# Patient Record
Sex: FEMALE | Race: WHITE | NOT HISPANIC OR LATINO | Employment: UNEMPLOYED | URBAN - METROPOLITAN AREA
[De-identification: names, ages, dates, MRNs, and addresses within clinical notes are randomized per-mention and may not be internally consistent; named-entity substitution may affect disease eponyms.]

---
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
## 2020-06-21 ENCOUNTER — HOSPITAL ENCOUNTER (EMERGENCY)
Facility: HOSPITAL | Age: 20
Discharge: HOME | End: 2020-06-21
Attending: EMERGENCY MEDICINE
Payer: COMMERCIAL

## 2020-06-21 VITALS
HEART RATE: 76 BPM | BODY MASS INDEX: 39.05 KG/M2 | OXYGEN SATURATION: 97 % | TEMPERATURE: 98.1 F | DIASTOLIC BLOOD PRESSURE: 79 MMHG | HEIGHT: 66 IN | SYSTOLIC BLOOD PRESSURE: 128 MMHG | RESPIRATION RATE: 14 BRPM | WEIGHT: 243 LBS

## 2020-06-21 DIAGNOSIS — R00.2 PALPITATIONS: Primary | ICD-10-CM

## 2020-06-21 LAB
ALBUMIN SERPL BCP-MCNC: 4.4 G/DL (ref 3.5–5)
ALP SERPL-CCNC: 93 U/L (ref 46–116)
ALT SERPL W P-5'-P-CCNC: 20 U/L (ref 12–78)
ANION GAP SERPL CALCULATED.3IONS-SCNC: 6 MMOL/L (ref 4–13)
AST SERPL W P-5'-P-CCNC: 11 U/L (ref 5–45)
BASOPHILS # BLD AUTO: 0.07 THOUSANDS/ΜL (ref 0–0.1)
BASOPHILS NFR BLD AUTO: 1 % (ref 0–1)
BILIRUB SERPL-MCNC: 0.41 MG/DL (ref 0.2–1)
BUN SERPL-MCNC: 12 MG/DL (ref 5–25)
CALCIUM SERPL-MCNC: 9.4 MG/DL (ref 8.3–10.1)
CHLORIDE SERPL-SCNC: 102 MMOL/L (ref 100–108)
CO2 SERPL-SCNC: 30 MMOL/L (ref 21–32)
CREAT SERPL-MCNC: 0.71 MG/DL (ref 0.6–1.3)
EOSINOPHIL # BLD AUTO: 0.17 THOUSAND/ΜL (ref 0–0.61)
EOSINOPHIL NFR BLD AUTO: 2 % (ref 0–6)
ERYTHROCYTE [DISTWIDTH] IN BLOOD BY AUTOMATED COUNT: 12 % (ref 11.6–15.1)
GFR SERPL CREATININE-BSD FRML MDRD: 122 ML/MIN/1.73SQ M
GLUCOSE SERPL-MCNC: 93 MG/DL (ref 65–140)
HCT VFR BLD AUTO: 43.5 % (ref 34.8–46.1)
HGB BLD-MCNC: 14.6 G/DL (ref 11.5–15.4)
IMM GRANULOCYTES # BLD AUTO: 0.01 THOUSAND/UL (ref 0–0.2)
IMM GRANULOCYTES NFR BLD AUTO: 0 % (ref 0–2)
LYMPHOCYTES # BLD AUTO: 2.49 THOUSANDS/ΜL (ref 0.6–4.47)
LYMPHOCYTES NFR BLD AUTO: 33 % (ref 14–44)
MAGNESIUM SERPL-MCNC: 1.7 MG/DL (ref 1.6–2.6)
MCH RBC QN AUTO: 30.5 PG (ref 26.8–34.3)
MCHC RBC AUTO-ENTMCNC: 33.6 G/DL (ref 31.4–37.4)
MCV RBC AUTO: 91 FL (ref 82–98)
MONOCYTES # BLD AUTO: 0.64 THOUSAND/ΜL (ref 0.17–1.22)
MONOCYTES NFR BLD AUTO: 8 % (ref 4–12)
NEUTROPHILS # BLD AUTO: 4.22 THOUSANDS/ΜL (ref 1.85–7.62)
NEUTS SEG NFR BLD AUTO: 56 % (ref 43–75)
NRBC BLD AUTO-RTO: 0 /100 WBCS
PLATELET # BLD AUTO: 356 THOUSANDS/UL (ref 149–390)
PMV BLD AUTO: 8.8 FL (ref 8.9–12.7)
POTASSIUM SERPL-SCNC: 4.4 MMOL/L (ref 3.5–5.3)
PROT SERPL-MCNC: 8.2 G/DL (ref 6.4–8.2)
RBC # BLD AUTO: 4.78 MILLION/UL (ref 3.81–5.12)
SODIUM SERPL-SCNC: 138 MMOL/L (ref 136–145)
TSH SERPL DL<=0.05 MIU/L-ACNC: 1.66 UIU/ML (ref 0.36–3.74)
WBC # BLD AUTO: 7.6 THOUSAND/UL (ref 4.31–10.16)

## 2020-06-21 PROCEDURE — 99283 EMERGENCY DEPT VISIT LOW MDM: CPT | Performed by: EMERGENCY MEDICINE

## 2020-06-21 PROCEDURE — 36415 COLL VENOUS BLD VENIPUNCTURE: CPT | Performed by: EMERGENCY MEDICINE

## 2020-06-21 PROCEDURE — 84443 ASSAY THYROID STIM HORMONE: CPT | Performed by: EMERGENCY MEDICINE

## 2020-06-21 PROCEDURE — 85025 COMPLETE CBC W/AUTO DIFF WBC: CPT | Performed by: EMERGENCY MEDICINE

## 2020-06-21 PROCEDURE — 99285 EMERGENCY DEPT VISIT HI MDM: CPT

## 2020-06-21 PROCEDURE — 83735 ASSAY OF MAGNESIUM: CPT | Performed by: EMERGENCY MEDICINE

## 2020-06-21 PROCEDURE — 93005 ELECTROCARDIOGRAM TRACING: CPT

## 2020-06-21 PROCEDURE — 80053 COMPREHEN METABOLIC PANEL: CPT | Performed by: EMERGENCY MEDICINE

## 2020-06-21 RX ORDER — CLOTRIMAZOLE 1 %
CREAM (GRAM) TOPICAL 2 TIMES DAILY
COMMUNITY
Start: 2016-11-03 | End: 2020-06-21

## 2020-06-21 RX ORDER — DIAPER,BRIEF,INFANT-TODD,DISP
EACH MISCELLANEOUS 2 TIMES DAILY
COMMUNITY
Start: 2016-11-03 | End: 2020-06-21

## 2020-06-21 RX ORDER — DEXTROAMPHETAMINE SACCHARATE, AMPHETAMINE ASPARTATE MONOHYDRATE, DEXTROAMPHETAMINE SULFATE AND AMPHETAMINE SULFATE 7.5; 7.5; 7.5; 7.5 MG/1; MG/1; MG/1; MG/1
1 CAPSULE, EXTENDED RELEASE ORAL EVERY 24 HOURS
COMMUNITY
Start: 2010-03-22

## 2020-06-21 RX ORDER — ALBUTEROL SULFATE 90 UG/1
2 AEROSOL, METERED RESPIRATORY (INHALATION) EVERY 4 HOURS
COMMUNITY
Start: 2010-01-21

## 2020-06-22 LAB
ATRIAL RATE: 79 BPM
P AXIS: 34 DEGREES
PR INTERVAL: 154 MS
QRS AXIS: 65 DEGREES
QRSD INTERVAL: 82 MS
QT INTERVAL: 356 MS
QTC INTERVAL: 408 MS
T WAVE AXIS: 29 DEGREES
VENTRICULAR RATE: 79 BPM

## 2020-06-22 PROCEDURE — 93010 ELECTROCARDIOGRAM REPORT: CPT | Performed by: INTERNAL MEDICINE

## 2020-07-25 ENCOUNTER — APPOINTMENT (EMERGENCY)
Dept: RADIOLOGY | Facility: HOSPITAL | Age: 20
End: 2020-07-25
Payer: COMMERCIAL

## 2020-07-25 ENCOUNTER — HOSPITAL ENCOUNTER (EMERGENCY)
Facility: HOSPITAL | Age: 20
Discharge: HOME | End: 2020-07-25
Attending: EMERGENCY MEDICINE | Admitting: EMERGENCY MEDICINE
Payer: COMMERCIAL

## 2020-07-25 VITALS
WEIGHT: 250.88 LBS | TEMPERATURE: 98.6 F | BODY MASS INDEX: 41.11 KG/M2 | RESPIRATION RATE: 18 BRPM | DIASTOLIC BLOOD PRESSURE: 71 MMHG | OXYGEN SATURATION: 97 % | HEART RATE: 84 BPM | SYSTOLIC BLOOD PRESSURE: 123 MMHG

## 2020-07-25 DIAGNOSIS — R07.9 CHEST PAIN: Primary | ICD-10-CM

## 2020-07-25 DIAGNOSIS — R82.71 BACTERIURIA: ICD-10-CM

## 2020-07-25 DIAGNOSIS — R10.9 ABDOMINAL PAIN: ICD-10-CM

## 2020-07-25 LAB
ALBUMIN SERPL BCP-MCNC: 4.2 G/DL (ref 3.5–5)
ALP SERPL-CCNC: 93 U/L (ref 46–116)
ALT SERPL W P-5'-P-CCNC: 27 U/L (ref 12–78)
ANION GAP SERPL CALCULATED.3IONS-SCNC: 9 MMOL/L (ref 4–13)
AST SERPL W P-5'-P-CCNC: 15 U/L (ref 5–45)
BACTERIA UR QL AUTO: ABNORMAL /HPF
BASOPHILS # BLD AUTO: 0.05 THOUSANDS/ΜL (ref 0–0.1)
BASOPHILS NFR BLD AUTO: 1 % (ref 0–1)
BILIRUB SERPL-MCNC: 0.42 MG/DL (ref 0.2–1)
BILIRUB UR QL STRIP: NEGATIVE
BUN SERPL-MCNC: 6 MG/DL (ref 5–25)
CALCIUM SERPL-MCNC: 8.7 MG/DL (ref 8.3–10.1)
CHLORIDE SERPL-SCNC: 102 MMOL/L (ref 100–108)
CLARITY UR: CLEAR
CO2 SERPL-SCNC: 26 MMOL/L (ref 21–32)
COLOR UR: YELLOW
CREAT SERPL-MCNC: 0.7 MG/DL (ref 0.6–1.3)
EOSINOPHIL # BLD AUTO: 0.12 THOUSAND/ΜL (ref 0–0.61)
EOSINOPHIL NFR BLD AUTO: 2 % (ref 0–6)
ERYTHROCYTE [DISTWIDTH] IN BLOOD BY AUTOMATED COUNT: 12 % (ref 11.6–15.1)
EXT PREG TEST URINE: NEGATIVE
EXT. CONTROL ED NAV: NORMAL
GFR SERPL CREATININE-BSD FRML MDRD: 124 ML/MIN/1.73SQ M
GLUCOSE SERPL-MCNC: 86 MG/DL (ref 65–140)
GLUCOSE UR STRIP-MCNC: NEGATIVE MG/DL
HCT VFR BLD AUTO: 41.5 % (ref 34.8–46.1)
HGB BLD-MCNC: 14.1 G/DL (ref 11.5–15.4)
HGB UR QL STRIP.AUTO: ABNORMAL
IMM GRANULOCYTES # BLD AUTO: 0.02 THOUSAND/UL (ref 0–0.2)
IMM GRANULOCYTES NFR BLD AUTO: 0 % (ref 0–2)
KETONES UR STRIP-MCNC: NEGATIVE MG/DL
LEUKOCYTE ESTERASE UR QL STRIP: ABNORMAL
LYMPHOCYTES # BLD AUTO: 2.23 THOUSANDS/ΜL (ref 0.6–4.47)
LYMPHOCYTES NFR BLD AUTO: 32 % (ref 14–44)
MCH RBC QN AUTO: 30.9 PG (ref 26.8–34.3)
MCHC RBC AUTO-ENTMCNC: 34 G/DL (ref 31.4–37.4)
MCV RBC AUTO: 91 FL (ref 82–98)
MONOCYTES # BLD AUTO: 0.57 THOUSAND/ΜL (ref 0.17–1.22)
MONOCYTES NFR BLD AUTO: 8 % (ref 4–12)
NEUTROPHILS # BLD AUTO: 4.02 THOUSANDS/ΜL (ref 1.85–7.62)
NEUTS SEG NFR BLD AUTO: 57 % (ref 43–75)
NITRITE UR QL STRIP: NEGATIVE
NON-SQ EPI CELLS URNS QL MICRO: ABNORMAL /HPF
NRBC BLD AUTO-RTO: 0 /100 WBCS
PH UR STRIP.AUTO: 7 [PH] (ref 4.5–8)
PLATELET # BLD AUTO: 335 THOUSANDS/UL (ref 149–390)
PMV BLD AUTO: 8.7 FL (ref 8.9–12.7)
POTASSIUM SERPL-SCNC: 3.8 MMOL/L (ref 3.5–5.3)
PROT SERPL-MCNC: 7.8 G/DL (ref 6.4–8.2)
PROT UR STRIP-MCNC: NEGATIVE MG/DL
RBC # BLD AUTO: 4.56 MILLION/UL (ref 3.81–5.12)
RBC #/AREA URNS AUTO: ABNORMAL /HPF
SODIUM SERPL-SCNC: 137 MMOL/L (ref 136–145)
SP GR UR STRIP.AUTO: 1.01 (ref 1–1.03)
TROPONIN I SERPL-MCNC: <0.02 NG/ML
TSH SERPL DL<=0.05 MIU/L-ACNC: 1.19 UIU/ML (ref 0.36–3.74)
UROBILINOGEN UR QL STRIP.AUTO: 0.2 E.U./DL
WBC # BLD AUTO: 7.01 THOUSAND/UL (ref 4.31–10.16)
WBC #/AREA URNS AUTO: ABNORMAL /HPF

## 2020-07-25 PROCEDURE — 71045 X-RAY EXAM CHEST 1 VIEW: CPT

## 2020-07-25 PROCEDURE — 84484 ASSAY OF TROPONIN QUANT: CPT | Performed by: PHYSICIAN ASSISTANT

## 2020-07-25 PROCEDURE — 36415 COLL VENOUS BLD VENIPUNCTURE: CPT | Performed by: PHYSICIAN ASSISTANT

## 2020-07-25 PROCEDURE — 81001 URINALYSIS AUTO W/SCOPE: CPT

## 2020-07-25 PROCEDURE — 80053 COMPREHEN METABOLIC PANEL: CPT | Performed by: PHYSICIAN ASSISTANT

## 2020-07-25 PROCEDURE — 93005 ELECTROCARDIOGRAM TRACING: CPT

## 2020-07-25 PROCEDURE — 99285 EMERGENCY DEPT VISIT HI MDM: CPT

## 2020-07-25 PROCEDURE — 96374 THER/PROPH/DIAG INJ IV PUSH: CPT

## 2020-07-25 PROCEDURE — 99284 EMERGENCY DEPT VISIT MOD MDM: CPT | Performed by: PHYSICIAN ASSISTANT

## 2020-07-25 PROCEDURE — 81025 URINE PREGNANCY TEST: CPT | Performed by: PHYSICIAN ASSISTANT

## 2020-07-25 PROCEDURE — 87086 URINE CULTURE/COLONY COUNT: CPT

## 2020-07-25 PROCEDURE — 85025 COMPLETE CBC W/AUTO DIFF WBC: CPT | Performed by: PHYSICIAN ASSISTANT

## 2020-07-25 PROCEDURE — 84443 ASSAY THYROID STIM HORMONE: CPT | Performed by: PHYSICIAN ASSISTANT

## 2020-07-25 RX ORDER — LIDOCAINE HYDROCHLORIDE 20 MG/ML
15 SOLUTION OROPHARYNGEAL ONCE
Status: COMPLETED | OUTPATIENT
Start: 2020-07-25 | End: 2020-07-25

## 2020-07-25 RX ORDER — MAGNESIUM HYDROXIDE/ALUMINUM HYDROXICE/SIMETHICONE 120; 1200; 1200 MG/30ML; MG/30ML; MG/30ML
30 SUSPENSION ORAL ONCE
Status: COMPLETED | OUTPATIENT
Start: 2020-07-25 | End: 2020-07-25

## 2020-07-25 RX ORDER — NITROFURANTOIN 25; 75 MG/1; MG/1
100 CAPSULE ORAL EVERY 12 HOURS SCHEDULED
Qty: 10 CAPSULE | Refills: 0 | Status: SHIPPED | OUTPATIENT
Start: 2020-07-25 | End: 2020-07-30

## 2020-07-25 RX ORDER — KETOROLAC TROMETHAMINE 30 MG/ML
15 INJECTION, SOLUTION INTRAMUSCULAR; INTRAVENOUS ONCE
Status: COMPLETED | OUTPATIENT
Start: 2020-07-25 | End: 2020-07-25

## 2020-07-25 RX ADMIN — ALUMINUM HYDROXIDE, MAGNESIUM HYDROXIDE, AND SIMETHICONE 30 ML: 200; 200; 20 SUSPENSION ORAL at 16:23

## 2020-07-25 RX ADMIN — KETOROLAC TROMETHAMINE 15 MG: 30 INJECTION, SOLUTION INTRAMUSCULAR at 16:27

## 2020-07-25 RX ADMIN — LIDOCAINE HYDROCHLORIDE 15 ML: 20 SOLUTION ORAL; TOPICAL at 16:24

## 2020-07-25 NOTE — DISCHARGE INSTRUCTIONS
Chest Pain   WHAT YOU NEED TO KNOW:   Chest pain can be caused by a range of conditions, from not serious to life-threatening  Chest pain can be a symptom of a digestive problem, such as acid reflux or a stomach ulcer  An anxiety attack or a strong emotion, such as anger, can also cause chest pain  Infection, inflammation, or a fracture in the bones or cartilage in your chest can cause pain or discomfort  Sometimes chest pain or pressure is caused by poor blood flow to your heart (angina)  Chest pain may also be caused by life-threatening conditions such as a heart attack or blood clot in your lungs  DISCHARGE INSTRUCTIONS:   Call 911 if:   · You have any of the following signs of a heart attack:      ¨ Squeezing, pressure, or pain in your chest that lasts longer than 5 minutes or returns    ¨ Discomfort or pain in your back, neck, jaw, stomach, or arm     ¨ Trouble breathing    ¨ Nausea or vomiting    ¨ Lightheadedness or a sudden cold sweat, especially with chest pain or trouble breathing    Return to the emergency department if:   · You have chest discomfort that gets worse, even with medicine  · You cough or vomit blood  · Your bowel movements are black or bloody  · You cannot stop vomiting, or it hurts to swallow  Contact your healthcare provider if:   · You have questions or concerns about your condition or care  Medicines:   · Medicines  may be given to treat the cause of your chest pain  Examples include pain medicine, anxiety medicine, or medicines to increase blood flow to your heart  · Do not take certain medicines without asking your healthcare provider first   These include NSAIDs, herbal or vitamin supplements, or hormones (estrogen or progestin)  · Take your medicine as directed  Contact your healthcare provider if you think your medicine is not helping or if you have side effects  Tell him or her if you are allergic to any medicine   Keep a list of the medicines, vitamins, and herbs you take  Include the amounts, and when and why you take them  Bring the list or the pill bottles to follow-up visits  Carry your medicine list with you in case of an emergency  Follow up with your healthcare provider within 72 hours, or as directed: You may need to return for more tests to find the cause of your chest pain  You may be referred to a specialist, such as a cardiologist or gastroenterologist  Write down your questions so you remember to ask them during your visits  Healthy living tips: The following are general healthy guidelines  If your chest pain is caused by a heart problem, your healthcare provider will give you specific guidelines to follow  · Do not smoke  Nicotine and other chemicals in cigarettes and cigars can cause lung and heart damage  Ask your healthcare provider for information if you currently smoke and need help to quit  E-cigarettes or smokeless tobacco still contain nicotine  Talk to your healthcare provider before you use these products  · Eat a variety of healthy, low-fat foods  Healthy foods include fruits, vegetables, whole-grain breads, low-fat dairy products, beans, lean meats, and fish  Ask for more information about a heart healthy diet  · Ask about activity  Your healthcare provider will tell you which activities to limit or avoid  Ask when you can drive, return to work, and have sex  Ask about the best exercise plan for you  · Maintain a healthy weight  Ask your healthcare provider how much you should weigh  Ask him or her to help you create a weight loss plan if you are overweight  · Get the flu and pneumonia vaccines  All adults should get the influenza (flu) vaccine  Get it every year as soon as it becomes available  The pneumococcal vaccine is given to adults aged 72 years or older  The vaccine is given every 5 years to prevent pneumococcal disease, such as pneumonia    © 2017 [employer] Information is for End User's use only and may not be sold, redistributed or otherwise used for commercial purposes  All illustrations and images included in CareNotes® are the copyrighted property of [employer]  or [employer]  The above information is an  only  It is not intended as medical advice for individual conditions or treatments  Talk to your doctor, nurse or pharmacist before following any medical regimen to see if it is safe and effective for you

## 2020-07-25 NOTE — ED PROVIDER NOTES
History  Chief Complaint   Patient presents with    Chest Pain     chest pressure with discomfort in abdomen,began today  no n/v/d no jaw pain      Davon Malone is a 23 y o  Female with a PMHX of ADHD, Anxiety and Asthma who presents to the ED with complaints of intermittent chest tightness and palpitations over the past month  Patient was seen for similar complaint on 06/21/2020 during which time work-up was unremarkable  Patient states she started having chest pressure and abdominal pain/bloating while she was at work  Patient describes the aching intermittent pain as "the entire chest but mainly my heart " Patient feels as though her "heart is beating out of my chest " Patient states she is supposed to be at her sister's baby shower for an event today but wanted to "make sure nothing was wrong and I am not pregnant " Patient states she was previously on birth control but had to discontinue medication due to significant weight gain  Patient states she is sexually active with 1 male partner  Denies nausea, vomiting, diarrhea, constipation, decreased appetite, headache, neck pain, neck stiffness, changes in vision, syncope, dizziness, leg pain, leg swelling, cough, congestion, SOB, wheezing, hemoptysis, fever, chills  Patient denies family hx of CAD  Patient denies family hx of sudden death         History provided by:  Patient  Chest Pain   Pain location:  L lateral chest ("Entire Chest")  Pain quality: aching and pressure    Pain severity:  Moderate  Timing:  Intermittent  Progression:  Waxing and waning  Associated symptoms: abdominal pain and palpitations    Associated symptoms: no AICD problem, no altered mental status, no anorexia, no back pain, no claudication, no cough, no diaphoresis, no dizziness, no dysphagia, no fatigue, no fever, no headache, no heartburn, no lower extremity edema, no nausea, no near-syncope, no numbness, no orthopnea, no PND, no shortness of breath, no syncope, not vomiting and no weakness    Risk factors: obesity    Risk factors: no coronary artery disease, no high cholesterol, no hypertension, not pregnant, no prior DVT/PE and no smoking        Prior to Admission Medications   Prescriptions Last Dose Informant Patient Reported? Taking? Cholecalciferol 50 MCG (2000 UT) CAPS 7/25/2020 at Unknown time  Yes Yes   Sig: Take by mouth   albuterol (ProAir HFA) 90 mcg/act inhaler 7/24/2020 at Unknown time  Yes Yes   Sig: Inhale 2 puffs every 4 (four) hours   amphetamine-dextroamphetamine (ADDERALL XR, 30MG,) 30 MG 24 hr capsule 7/25/2020 at Unknown time  Yes Yes   Sig: Take 1 capsule by mouth every 24 hours   fluticasone-salmeterol (Advair Diskus) 100-50 mcg/dose inhaler 7/25/2020 at Unknown time  Yes Yes   Sig: Inhale      Facility-Administered Medications: None       Past Medical History:   Diagnosis Date    ADHD     Anxiety     Asthma     Depression     Psychiatric disorder        Past Surgical History:   Procedure Laterality Date    CHOLECYSTECTOMY      TONSILLECTOMY         No family history on file  I have reviewed and agree with the history as documented  E-Cigarette/Vaping    E-Cigarette Use Never User      E-Cigarette/Vaping Substances     Social History     Tobacco Use    Smoking status: Never Smoker    Smokeless tobacco: Never Used   Substance Use Topics    Alcohol use: Never     Frequency: Never    Drug use: Never       Review of Systems   Constitutional: Negative for appetite change, chills, diaphoresis, fatigue, fever and unexpected weight change  HENT: Negative for congestion, drooling, ear pain, rhinorrhea, sore throat, trouble swallowing and voice change  Eyes: Negative for pain, discharge, redness and visual disturbance  Respiratory: Negative for cough, shortness of breath, wheezing and stridor  Cardiovascular: Positive for chest pain and palpitations  Negative for orthopnea, claudication, leg swelling, syncope, PND and near-syncope     Gastrointestinal: Positive for abdominal distention and abdominal pain  Negative for anorexia, blood in stool, constipation, diarrhea, heartburn, nausea and vomiting  Genitourinary: Negative for dysuria, flank pain, frequency, hematuria and urgency  Musculoskeletal: Negative for back pain, gait problem, joint swelling, neck pain and neck stiffness  Skin: Negative for color change and rash  Neurological: Negative for dizziness, seizures, weakness, light-headedness, numbness and headaches  Physical Exam  Physical Exam   Constitutional: She is oriented to person, place, and time  She appears well-developed and well-nourished  HENT:   Head: Normocephalic and atraumatic  Nose: Nose normal    Mouth/Throat: Oropharynx is clear and moist    Eyes: Pupils are equal, round, and reactive to light  Conjunctivae and EOM are normal    Cardiovascular: Normal rate, regular rhythm and intact distal pulses  Pulmonary/Chest: Effort normal and breath sounds normal  She exhibits tenderness  Abdominal: Normal appearance and bowel sounds are normal  There is no tenderness  There is no rigidity, no guarding and no CVA tenderness  Musculoskeletal: Normal range of motion  No lower extremity edema or erythema  Negative dylon's sign bilaterally  Neurological: She is alert and oriented to person, place, and time  Skin: Skin is warm and dry  Capillary refill takes less than 2 seconds  Psychiatric: She has a normal mood and affect  Nursing note and vitals reviewed        Vital Signs  ED Triage Vitals   Temperature Pulse Respirations Blood Pressure SpO2   07/25/20 1536 07/25/20 1534 07/25/20 1534 07/25/20 1534 07/25/20 1534   98 6 °F (37 °C) 85 18 123/71 99 %      Temp Source Heart Rate Source Patient Position - Orthostatic VS BP Location FiO2 (%)   07/25/20 1536 07/25/20 1534 07/25/20 1534 07/25/20 1534 --   Oral Monitor Sitting Right arm       Pain Score       07/25/20 1534       4           Vitals:    07/25/20 1534   BP: 123/71 Pulse: 85   Patient Position - Orthostatic VS: Sitting         Visual Acuity      ED Medications  Medications   ketorolac (TORADOL) injection 15 mg (15 mg Intravenous Given 7/25/20 1627)   aluminum-magnesium hydroxide-simethicone (MYLANTA) 200-200-20 mg/5 mL oral suspension 30 mL (30 mL Oral Given 7/25/20 1623)   Lidocaine Viscous HCl (XYLOCAINE) 2 % mucosal solution 15 mL (15 mL Swish & Spit Given 7/25/20 1624)       Diagnostic Studies  Results Reviewed     Procedure Component Value Units Date/Time    Urine Microscopic [[id number]]  (Abnormal) Collected:  07/25/20 1635    Lab Status:  Final result Specimen:  Urine, Clean Catch Updated:  07/25/20 1700     RBC, UA 4-10 /hpf      WBC, UA 30-50 /hpf      Epithelial Cells Moderate /hpf      Bacteria, UA Moderate /hpf     Urine culture [[id number]] Collected:  07/25/20 1635    Lab Status: In process Specimen:  Urine, Clean Catch Updated:  07/25/20 1700    TSH [[id number]]  (Normal) Collected:  07/25/20 1604    Lab Status:  Final result Specimen:  Blood from Arm, Right Updated:  07/25/20 1656     TSH 3RD GENERATON 1 192 uIU/mL     Narrative:       Patients undergoing fluorescein dye angiography may retain small amounts of fluorescein in the body for 48-72 hours post procedure  Samples containing fluorescein can produce falsely depressed TSH values  If the patient had this procedure,a specimen should be resubmitted post fluorescein clearance        Troponin I [[id number]]  (Normal) Collected:  07/25/20 1604    Lab Status:  Final result Specimen:  Blood from Arm, Right Updated:  07/25/20 1650     Troponin I <0 02 ng/mL     Comprehensive metabolic panel [[id number]] Collected:  07/25/20 1604    Lab Status:  Final result Specimen:  Blood from Arm, Right Updated:  07/25/20 1648     Sodium 137 mmol/L      Potassium 3 8 mmol/L      Chloride 102 mmol/L      CO2 26 mmol/L      ANION GAP 9 mmol/L      BUN 6 mg/dL      Creatinine 0 70 mg/dL      Glucose 86 mg/dL      Calcium 8 7 mg/dL AST 15 U/L      ALT 27 U/L      Alkaline Phosphatase 93 U/L      Total Protein 7 8 g/dL      Albumin 4 2 g/dL      Total Bilirubin 0 42 mg/dL      eGFR 124 ml/min/1 73sq m     Narrative:       [place] guidelines for Chronic Kidney Disease (CKD):     Stage 1 with normal or high GFR (GFR > 90 mL/min/1 73 square meters)    Stage 2 Mild CKD (GFR = 60-89 mL/min/1 73 square meters)    Stage 3A Moderate CKD (GFR = 45-59 mL/min/1 73 square meters)    Stage 3B Moderate CKD (GFR = 30-44 mL/min/1 73 square meters)    Stage 4 Severe CKD (GFR = 15-29 mL/min/1 73 square meters)    Stage 5 End Stage CKD (GFR <15 mL/min/1 73 square meters)  Note: GFR calculation is accurate only with a steady state creatinine    POCT pregnancy, urine [[id number]]  (Normal) Resulted:  07/25/20 1621    Lab Status:  Final result Updated:  07/25/20 1622     EXT PREG TEST UR (Ref: Negative) Negative     Control Valid    Urine Macroscopic, POC [[id number]]  (Abnormal) Collected:  07/25/20 1635    Lab Status:  Final result Specimen:  Urine Updated:  07/25/20 1619     Color, UA Yellow     Clarity, UA Clear     pH, UA 7 0     Leukocytes, UA Small     Nitrite, UA Negative     Protein, UA Negative mg/dl      Glucose, UA Negative mg/dl      Ketones, UA Negative mg/dl      Urobilinogen, UA 0 2 E U /dl      Bilirubin, UA Negative     Blood, UA Trace     Specific [place], UA 1 015    Narrative:       CLINITEK RESULT    CBC and differential [[id number]]  (Abnormal) Collected:  07/25/20 1604    Lab Status:  Final result Specimen:  Blood from Arm, Right Updated:  07/25/20 1618     WBC 7 01 Thousand/uL      RBC 4 56 Million/uL      Hemoglobin 14 1 g/dL      Hematocrit 41 5 %      MCV 91 fL      MCH 30 9 pg      MCHC 34 0 g/dL      RDW 12 0 %      MPV 8 7 fL      Platelets 814 Thousands/uL      nRBC 0 /100 WBCs      Neutrophils Relative 57 %      Immat GRANS % 0 %      Lymphocytes Relative 32 %      Monocytes Relative 8 % Eosinophils Relative 2 %      Basophils Relative 1 %      Neutrophils Absolute 4 02 Thousands/µL      Immature Grans Absolute 0 02 Thousand/uL      Lymphocytes Absolute 2 23 Thousands/µL      Monocytes Absolute 0 57 Thousand/µL      Eosinophils Absolute 0 12 Thousand/µL      Basophils Absolute 0 05 Thousands/µL                  XR chest 1 view portable   ED Interpretation by Tanvir Crump PA-C (07/25 1709)   No acute cardiopulmonary disease                 Procedures  ECG 12 Lead Documentation Only  Date/Time: 7/25/2020 3:38 PM  Performed by: Tanvir Crump PA-C  Authorized by: Suzan Douglas MD     Indications / Diagnosis:  Chest Pain, Palpitations  ECG reviewed by me, the ED Provider: yes    Patient location:  ED  Previous ECG:     Previous ECG:  Compared to current    Comparison ECG info:  06/21/2020    Similarity:  No change  Rate:     ECG rate:  74    ECG rate assessment: normal    Rhythm:     Rhythm: sinus rhythm    QRS:     QRS axis:  Normal  ST segments:     ST segments:  Normal  T waves:     T waves: flattening      Flattening:  V2  Comments:      No acute ST or T wave changes  QT/QTc 380/421  ED Course  ED Course as of Jul 25 1733   Sat Jul 25, 2020   1717 Educated patient regarding diagnosis and management  Advised patient to follow up with PCP  Advised patient to RTER for persistent or worsening symptoms  US AUDIT      Most Recent Value   Initial Alcohol Screen: US AUDIT-C    1  How often do you have a drink containing alcohol?  0 Filed at: 07/25/2020 1535   2  How many drinks containing alcohol do you have on a typical day you are drinking? 0 Filed at: 07/25/2020 1535   3a  Male UNDER 65: How often do you have five or more drinks on one occasion? 0 Filed at: 07/25/2020 1535   3b  FEMALE Any Age, or MALE 65+: How often do you have 4 or more drinks on one occassion?   0 Filed at: 07/25/2020 1535   Audit-C Score  0 Filed at: 07/25/2020 1535            HEART Risk Score Most Recent Value   Heart Score Risk Calculator   History  0 Filed at: 07/25/2020 1720   ECG  0 Filed at: 07/25/2020 1720   Age  0 Filed at: 07/25/2020 1720   Risk Factors  0 Filed at: 07/25/2020 1720   Troponin  0 Filed at: 07/25/2020 1720   HEART Score  0 Filed at: 07/25/2020 1720                          Wells' Criteria for PE      Most Recent Value   Wells' Criteria for PE   Clinical signs and symptoms of DVT  0 Filed at: 07/25/2020 7134   PE is primary diagnosis or equally likely  0 Filed at: 07/25/2020 1720   HR >100  0 Filed at: 07/25/2020 1720   Immobilization at least 3 days or Surgery in the previous 4 weeks  0 Filed at: 07/25/2020 1720   Previous, objectively diagnosed PE or DVT  0 Filed at: 07/25/2020 1720   Hemoptysis  0 Filed at: 07/25/2020 1720   Malignancy with treatment within 6 months or palliative  0 Filed at: 07/25/2020 1720   Wells' Criteria Total  0 Filed at: 07/25/2020 1720                  MDM  Number of Diagnoses or Management Options  Abdominal pain: new and requires workup  Bacteriuria: new and requires workup  Chest pain: new and requires workup  Diagnosis management comments: EKG unremarkable  CXR without acute findings  Labs WNL  UA with moderate bacteria  Patient does not have symptoms or UTI  Will send urine culture  I provided patient with strict RTER precautions  I advised patient follow-up with PCP in 24-48 hours  Patient verbalized understanding          Amount and/or Complexity of Data Reviewed  Clinical lab tests: reviewed and ordered  Tests in the radiology section of CPT®: ordered and reviewed  Review and summarize past medical records: yes    Patient Progress  Patient progress: stable        Disposition  Final diagnoses:   Chest pain   Abdominal pain   Bacteriuria     Time reflects when diagnosis was documented in both MDM as applicable and the Disposition within this note     Time User Action Codes Description Comment    7/25/2020  5:16 PM Balta Deleon Add [R07 9] Chest pain     7/25/2020  5:16 PM Balta Gess Add [R10 9] Abdominal pain     7/25/2020  5:16 PM Balta Gess Add [R82 71] Bacteriuria       ED Disposition     ED Disposition Condition Date/Time Comment    Discharge Stable Sat Jul 25, 2020  5:21 PM Kerney Ards discharge to home/self care  Follow-up Information     Follow up With Specialties Details Why Contact Info Additional [address] Emergency Department Emergency Medicine Go to  If symptoms worsen [address] [facility]  AN ED, [address], [place], [place], [address] [facility] Internal Medicine Schedule an appointment as soon as possible for a visit   [address] [facility] [address] [facility] [address]  [address] [place] [address], [address] [phone]          Patient's Medications   Discharge Prescriptions    NITROFURANTOIN (MACROBID) 100 MG CAPSULE    Take 1 capsule (100 mg total) by mouth every 12 (twelve) hours for 5 days       Start Date: 7/25/2020 End Date: 7/30/2020       Order Dose: 100 mg       Quantity: 10 capsule    Refills: 0     No discharge procedures on file      PDMP Review     None          ED Provider  Electronically Signed by           Beto Almanza PA-C  07/25/20 6977

## 2020-07-26 LAB
ATRIAL RATE: 74 BPM
P AXIS: 27 DEGREES
PR INTERVAL: 144 MS
QRS AXIS: 28 DEGREES
QRSD INTERVAL: 80 MS
QT INTERVAL: 380 MS
QTC INTERVAL: 421 MS
T WAVE AXIS: 40 DEGREES
VENTRICULAR RATE: 74 BPM

## 2020-07-26 PROCEDURE — 93010 ELECTROCARDIOGRAM REPORT: CPT | Performed by: INTERNAL MEDICINE

## 2020-07-27 LAB — BACTERIA UR CULT: NORMAL

## 2020-09-21 ENCOUNTER — OFFICE VISIT (OUTPATIENT)
Dept: URGENT CARE | Facility: CLINIC | Age: 20
End: 2020-09-21
Payer: COMMERCIAL

## 2020-09-21 VITALS
HEART RATE: 69 BPM | WEIGHT: 242 LBS | SYSTOLIC BLOOD PRESSURE: 127 MMHG | HEIGHT: 66 IN | OXYGEN SATURATION: 99 % | RESPIRATION RATE: 18 BRPM | BODY MASS INDEX: 38.89 KG/M2 | TEMPERATURE: 98.3 F | DIASTOLIC BLOOD PRESSURE: 69 MMHG

## 2020-09-21 DIAGNOSIS — R07.9 CHEST PAIN, UNSPECIFIED TYPE: Primary | ICD-10-CM

## 2020-09-21 LAB
ATRIAL RATE: 72 BPM
ATRIAL RATE: 72 BPM
P AXIS: 33 DEGREES
P AXIS: 33 DEGREES
PR INTERVAL: 146 MS
PR INTERVAL: 146 MS
QRS AXIS: 66 DEGREES
QRS AXIS: 66 DEGREES
QRSD INTERVAL: 88 MS
QRSD INTERVAL: 88 MS
QT INTERVAL: 380 MS
QT INTERVAL: 380 MS
QTC INTERVAL: 416 MS
QTC INTERVAL: 416 MS
T WAVE AXIS: 41 DEGREES
T WAVE AXIS: 41 DEGREES
VENTRICULAR RATE: 72 BPM
VENTRICULAR RATE: 72 BPM

## 2020-09-21 PROCEDURE — 93005 ELECTROCARDIOGRAM TRACING: CPT | Performed by: PREVENTIVE MEDICINE

## 2020-09-21 PROCEDURE — 93010 ELECTROCARDIOGRAM REPORT: CPT | Performed by: INTERNAL MEDICINE

## 2020-09-21 PROCEDURE — 99213 OFFICE O/P EST LOW 20 MIN: CPT | Performed by: PREVENTIVE MEDICINE

## 2020-09-21 NOTE — LETTER
September 21, 2020     Patient: Gita Parks   YOB: 2000   Date of Visit: 9/21/2020       To Whom It May Concern: It is my medical opinion that Gita Parks may return to work on 9/22/2020  Please excuse her for work on 9/21/2020  If you have any questions or concerns, please don't hesitate to call           Sincerely,        Mic Valdovinos MD    CC: No Recipients

## 2020-09-21 NOTE — PATIENT INSTRUCTIONS
Chest Pain   AMBULATORY CARE:   Chest pain  can be caused by a range of conditions, from not serious to life-threatening  It may be caused by a heart attack or a blood clot in your lungs  Sometimes chest pain or pressure is caused by poor blood flow to your heart (angina)  Infection, inflammation, or a fracture in the bones or cartilage in your chest can cause pain or discomfort  Chest pain can also be a symptom of a digestive problem, such as acid reflux or a stomach ulcer  An anxiety attack or a strong emotion such as anger can also cause chest pain  It is important to follow up with your healthcare provider to find the cause of your chest pain  Common symptoms you may have with chest pain:   · Fever or sweating     · Nausea or vomiting     · Shortness of breath     · Discomfort or pressure that spreads from your chest to your back, jaw, or arm     · A racing or slow heartbeat     · Feeling weak, tired, or faint  Call 911 if:   · You have any of the following signs of a heart attack:      ¨ Squeezing, pressure, or pain in your chest that lasts longer than 5 minutes or returns    ¨ Discomfort or pain in your back, neck, jaw, stomach, or arm     ¨ Trouble breathing    ¨ Nausea or vomiting    ¨ Lightheadedness or a sudden cold sweat, especially with chest pain or trouble breathing    Seek care immediately if:   · You have chest discomfort that gets worse, even with medicine  · You cough or vomit blood  · Your bowel movements are black or bloody  · You cannot stop vomiting, or it hurts to swallow  Contact your healthcare provider if:   · You have questions or concerns about your condition or care  Treatment for chest pain  may include medicine to treat your symptoms while your healthcare provider finds the cause of your chest pain  · Medicines  may be given to treat the cause of your chest pain  Examples include pain medicine, anxiety medicine, or medicines to increase blood flow to your heart  · Do not take certain medicines without asking your healthcare provider first   These include NSAIDs, herbal or vitamin supplements, or hormones (estrogen or progestin)  Follow up with your healthcare provider within 72 hours, or as directed: You may need to return for more tests to find the cause of your chest pain  You may be referred to a specialist, such as a cardiologist or gastroenterologist  Write down your questions so you remember to ask them during your visits  Healthy living tips: The following are general healthy guidelines  If your chest pain is caused by a heart problem, your healthcare provider will give you specific guidelines to follow  · Do not smoke  Nicotine and other chemicals in cigarettes and cigars can cause lung and heart damage  Ask your healthcare provider for information if you currently smoke and need help to quit  E-cigarettes or smokeless tobacco still contain nicotine  Talk to your healthcare provider before you use these products  · Eat a variety of healthy, low-fat foods  Healthy foods include fruits, vegetables, whole-grain breads, low-fat dairy products, beans, lean meats, and fish  Ask for more information about a heart healthy diet  · Ask about activity  Your healthcare provider will tell you which activities to limit or avoid  Ask when you can drive, return to work, and have sex  Ask about the best exercise plan for you  · Maintain a healthy weight  Ask your healthcare provider how much you should weigh  Ask him or her to help you create a weight loss plan if you are overweight  · Get the flu and pneumonia vaccines  All adults should get the influenza (flu) vaccine  Get it every year as soon as it becomes available  The pneumococcal vaccine is given to adults aged 72 years or older  The vaccine is given every 5 years to prevent pneumococcal disease, such as pneumonia    © 2017 [employer] Information is for End User's use only and may not be sold, redistributed or otherwise used for commercial purposes  All illustrations and images included in CareNotes® are the copyrighted property of A D A M , Inc  or [employer]  The above information is an  only  It is not intended as medical advice for individual conditions or treatments  Talk to your doctor, nurse or pharmacist before following any medical regimen to see if it is safe and effective for you

## 2020-09-30 NOTE — PROGRESS NOTES
[facility]        NAME: Brenda Christina is a 23 y o  female  : 2000    MRN: [id number]  DATE: 2020  TIME: 1:25 PM    Assessment and Plan   Chest pain, unspecified type [R07 9]  1  Chest pain, unspecified type  ECG 12 lead    Ambulatory referral to Cardiology         Patient Instructions       Follow up with PCP in 3-5 days  Proceed to  ER if symptoms worsen  Chief Complaint     Chief Complaint   Patient presents with    Chest Pain     Pt c/o chest congestion that comes and goes x 1 day  History of Present Illness       Chest Pain    This is a new problem  The onset quality is sudden  The problem occurs constantly  The problem has been unchanged  The pain is present in the substernal region  The pain is at a severity of 4/10  The pain is moderate  The pain does not radiate  Associated symptoms include a cough  The cough has no precipitants  The cough is non-productive  There is no color change associated with the cough  Nothing relieves the cough  Nothing worsens the cough  The pain is aggravated by nothing  She has tried nothing for the symptoms  The treatment provided no relief  There are no known risk factors  Review of Systems   Review of Systems   Constitutional: Negative  HENT: Negative  Respiratory: Positive for cough and chest tightness  Cardiovascular: Positive for chest pain  All other systems reviewed and are negative          Current Medications       Current Outpatient Medications:     albuterol (ProAir HFA) 90 mcg/act inhaler, Inhale 2 puffs every 4 (four) hours, Disp: , Rfl:     amphetamine-dextroamphetamine (ADDERALL XR, 30MG,) 30 MG 24 hr capsule, Take 1 capsule by mouth every 24 hours, Disp: , Rfl:     fluticasone-salmeterol (Advair Diskus) 100-50 mcg/dose inhaler, Inhale, Disp: , Rfl:     Cholecalciferol 50 MCG ( UT) CAPS, Take by mouth, Disp: , Rfl:     Current Allergies     Allergies as of 2020 - Reviewed 2020 Allergen Reaction Noted    Penicillins Hives 12/14/2008            The following portions of the patient's history were reviewed and updated as appropriate: allergies, current medications, past family history, past medical history, past social history, past surgical history and problem list      Past Medical History:   Diagnosis Date    ADHD     Anxiety     Asthma     Depression     Psychiatric disorder        Past Surgical History:   Procedure Laterality Date    CHOLECYSTECTOMY      TONSILLECTOMY         History reviewed  No pertinent family history  Medications have been verified  Objective   /69   Pulse 69   Temp 98 3 °F (36 8 °C) (Tympanic)   Resp 18   Ht 5' 5 5" (1 664 m)   Wt 110 kg (242 lb)   LMP 08/31/2020 (Exact Date)   SpO2 99%   BMI 39 66 kg/m²        Physical Exam     Physical Exam  Vitals signs and nursing note reviewed  Constitutional:       Appearance: She is well-developed  HENT:      Head: Normocephalic  Right Ear: External ear normal       Left Ear: External ear normal       Nose: Mucosal edema and rhinorrhea present  Mouth/Throat:      Pharynx: Posterior oropharyngeal erythema present  No oropharyngeal exudate  Neck:      Musculoskeletal: Normal range of motion  Cardiovascular:      Rate and Rhythm: Normal rate and regular rhythm  Heart sounds: Normal heart sounds  Pulmonary:      Effort: Pulmonary effort is normal       Breath sounds: No wheezing  Lymphadenopathy:      Cervical: No cervical adenopathy  Skin:     General: Skin is warm  Coloration: Skin is not pale  Findings: No rash

## 2021-05-21 ENCOUNTER — HOSPITAL ENCOUNTER (EMERGENCY)
Facility: HOSPITAL | Age: 21
Discharge: HOME | End: 2021-05-21
Attending: EMERGENCY MEDICINE
Payer: COMMERCIAL

## 2021-05-21 ENCOUNTER — APPOINTMENT (EMERGENCY)
Dept: RADIOLOGY | Facility: HOSPITAL | Age: 21
End: 2021-05-21
Payer: COMMERCIAL

## 2021-05-21 VITALS
SYSTOLIC BLOOD PRESSURE: 115 MMHG | WEIGHT: 230.6 LBS | TEMPERATURE: 98.3 F | HEART RATE: 71 BPM | OXYGEN SATURATION: 99 % | DIASTOLIC BLOOD PRESSURE: 80 MMHG | BODY MASS INDEX: 37.06 KG/M2 | HEIGHT: 66 IN | RESPIRATION RATE: 18 BRPM

## 2021-05-21 DIAGNOSIS — R07.89 CHEST WALL PAIN: Primary | ICD-10-CM

## 2021-05-21 DIAGNOSIS — R09.1 PLEURISY: ICD-10-CM

## 2021-05-21 LAB
ALBUMIN SERPL BCP-MCNC: 3.7 G/DL (ref 3.5–5)
ALP SERPL-CCNC: 93 U/L (ref 46–116)
ALT SERPL W P-5'-P-CCNC: 15 U/L (ref 12–78)
ANION GAP SERPL CALCULATED.3IONS-SCNC: 9 MMOL/L (ref 4–13)
APTT PPP: 29 SECONDS (ref 23–37)
AST SERPL W P-5'-P-CCNC: 11 U/L (ref 5–45)
ATRIAL RATE: 88 BPM
BASOPHILS # BLD AUTO: 0.05 THOUSANDS/ΜL (ref 0–0.1)
BASOPHILS NFR BLD AUTO: 1 % (ref 0–1)
BILIRUB SERPL-MCNC: 0.16 MG/DL (ref 0.2–1)
BUN SERPL-MCNC: 16 MG/DL (ref 5–25)
CALCIUM SERPL-MCNC: 8.2 MG/DL (ref 8.3–10.1)
CHLORIDE SERPL-SCNC: 106 MMOL/L (ref 100–108)
CK SERPL-CCNC: 101 U/L (ref 26–192)
CO2 SERPL-SCNC: 24 MMOL/L (ref 21–32)
CREAT SERPL-MCNC: 0.75 MG/DL (ref 0.6–1.3)
D DIMER PPP FEU-MCNC: <0.27 UG/ML FEU
EOSINOPHIL # BLD AUTO: 0.16 THOUSAND/ΜL (ref 0–0.61)
EOSINOPHIL NFR BLD AUTO: 3 % (ref 0–6)
ERYTHROCYTE [DISTWIDTH] IN BLOOD BY AUTOMATED COUNT: 12 % (ref 11.6–15.1)
GFR SERPL CREATININE-BSD FRML MDRD: 114 ML/MIN/1.73SQ M
GLUCOSE SERPL-MCNC: 77 MG/DL (ref 65–140)
HCG SERPL QL: NEGATIVE
HCT VFR BLD AUTO: 41.2 % (ref 34.8–46.1)
HGB BLD-MCNC: 13.3 G/DL (ref 11.5–15.4)
IMM GRANULOCYTES # BLD AUTO: 0.01 THOUSAND/UL (ref 0–0.2)
IMM GRANULOCYTES NFR BLD AUTO: 0 % (ref 0–2)
INR PPP: 0.9 (ref 0.84–1.19)
LIPASE SERPL-CCNC: 100 U/L (ref 73–393)
LYMPHOCYTES # BLD AUTO: 1.83 THOUSANDS/ΜL (ref 0.6–4.47)
LYMPHOCYTES NFR BLD AUTO: 32 % (ref 14–44)
MCH RBC QN AUTO: 30.8 PG (ref 26.8–34.3)
MCHC RBC AUTO-ENTMCNC: 32.3 G/DL (ref 31.4–37.4)
MCV RBC AUTO: 95 FL (ref 82–98)
MONOCYTES # BLD AUTO: 0.48 THOUSAND/ΜL (ref 0.17–1.22)
MONOCYTES NFR BLD AUTO: 8 % (ref 4–12)
NEUTROPHILS # BLD AUTO: 3.2 THOUSANDS/ΜL (ref 1.85–7.62)
NEUTS SEG NFR BLD AUTO: 56 % (ref 43–75)
NRBC BLD AUTO-RTO: 0 /100 WBCS
P AXIS: 65 DEGREES
PLATELET # BLD AUTO: 303 THOUSANDS/UL (ref 149–390)
PMV BLD AUTO: 8.6 FL (ref 8.9–12.7)
POTASSIUM SERPL-SCNC: 4.3 MMOL/L (ref 3.5–5.3)
PR INTERVAL: 152 MS
PROT SERPL-MCNC: 7.2 G/DL (ref 6.4–8.2)
PROTHROMBIN TIME: 12.3 SECONDS (ref 11.6–14.5)
QRS AXIS: 87 DEGREES
QRSD INTERVAL: 80 MS
QT INTERVAL: 350 MS
QTC INTERVAL: 409 MS
RBC # BLD AUTO: 4.32 MILLION/UL (ref 3.81–5.12)
SODIUM SERPL-SCNC: 139 MMOL/L (ref 136–145)
T WAVE AXIS: 64 DEGREES
TROPONIN I SERPL-MCNC: <0.02 NG/ML
VENTRICULAR RATE: 82 BPM
WBC # BLD AUTO: 5.73 THOUSAND/UL (ref 4.31–10.16)

## 2021-05-21 PROCEDURE — 85610 PROTHROMBIN TIME: CPT | Performed by: EMERGENCY MEDICINE

## 2021-05-21 PROCEDURE — 99285 EMERGENCY DEPT VISIT HI MDM: CPT

## 2021-05-21 PROCEDURE — 84703 CHORIONIC GONADOTROPIN ASSAY: CPT | Performed by: EMERGENCY MEDICINE

## 2021-05-21 PROCEDURE — 80053 COMPREHEN METABOLIC PANEL: CPT | Performed by: EMERGENCY MEDICINE

## 2021-05-21 PROCEDURE — 82550 ASSAY OF CK (CPK): CPT | Performed by: EMERGENCY MEDICINE

## 2021-05-21 PROCEDURE — 85730 THROMBOPLASTIN TIME PARTIAL: CPT | Performed by: EMERGENCY MEDICINE

## 2021-05-21 PROCEDURE — 93010 ELECTROCARDIOGRAM REPORT: CPT | Performed by: INTERNAL MEDICINE

## 2021-05-21 PROCEDURE — 93005 ELECTROCARDIOGRAM TRACING: CPT

## 2021-05-21 PROCEDURE — 96374 THER/PROPH/DIAG INJ IV PUSH: CPT

## 2021-05-21 PROCEDURE — 85379 FIBRIN DEGRADATION QUANT: CPT | Performed by: EMERGENCY MEDICINE

## 2021-05-21 PROCEDURE — 71045 X-RAY EXAM CHEST 1 VIEW: CPT

## 2021-05-21 PROCEDURE — 83690 ASSAY OF LIPASE: CPT | Performed by: EMERGENCY MEDICINE

## 2021-05-21 PROCEDURE — 36415 COLL VENOUS BLD VENIPUNCTURE: CPT | Performed by: EMERGENCY MEDICINE

## 2021-05-21 PROCEDURE — 84484 ASSAY OF TROPONIN QUANT: CPT | Performed by: EMERGENCY MEDICINE

## 2021-05-21 PROCEDURE — 99284 EMERGENCY DEPT VISIT MOD MDM: CPT | Performed by: EMERGENCY MEDICINE

## 2021-05-21 PROCEDURE — 85025 COMPLETE CBC W/AUTO DIFF WBC: CPT | Performed by: EMERGENCY MEDICINE

## 2021-05-21 RX ORDER — ACETAMINOPHEN 325 MG/1
975 TABLET ORAL ONCE
Status: COMPLETED | OUTPATIENT
Start: 2021-05-21 | End: 2021-05-21

## 2021-05-21 RX ADMIN — FAMOTIDINE 20 MG: 10 INJECTION, SOLUTION INTRAVENOUS at 07:18

## 2021-05-21 RX ADMIN — ACETAMINOPHEN 975 MG: 325 TABLET, FILM COATED ORAL at 07:17

## 2021-05-21 NOTE — ED NOTES
PT awake and alert, no distress noted  No other questions upon d/c       April Epifanio Pedraza, RN  05/21/21 2919

## 2021-05-21 NOTE — ED PROVIDER NOTES
History  Chief Complaint   Patient presents with    Chest Pain     Patient reports chest pressure that started yesterday at 1200  Patient states she has SOB w/ exertion  Pain 6/10     Patient is a 24year old female with constant chest pain since yesterday afternoon with sob this AM and pleuritic pain  No N/V  No fever or cough  No travel  Denies BCP  No h/o blood clots in family  Was last seen in this ED on 7/25/20 for chest pain  [facility] website checked on this patient and last Rx filled was on 3/24/21 for vicodin for 2 day supply  Denies cocaine use  Denies smoking  No CAD in family  History provided by:  Patient   used: No    Chest Pain  Associated symptoms: shortness of breath    Associated symptoms: no cough, no fever, no nausea and not vomiting        Prior to Admission Medications   Prescriptions Last Dose Informant Patient Reported? Taking? Cholecalciferol 50 MCG (2000 UT) CAPS Not Taking at Unknown time  Yes No   Sig: Take by mouth   albuterol (ProAir HFA) 90 mcg/act inhaler Past Month at Unknown time  Yes Yes   Sig: Inhale 2 puffs every 4 (four) hours   amphetamine-dextroamphetamine (ADDERALL XR, 30MG,) 30 MG 24 hr capsule Not Taking at Unknown time  Yes No   Sig: Take 1 capsule by mouth every 24 hours   fluticasone-salmeterol (Advair Diskus) 100-50 mcg/dose inhaler Past Month at Unknown time  Yes Yes   Sig: Inhale      Facility-Administered Medications: None       Past Medical History:   Diagnosis Date    ADHD     Anxiety     Asthma     Depression     Psychiatric disorder        Past Surgical History:   Procedure Laterality Date    CHOLECYSTECTOMY      TONSILLECTOMY         History reviewed  No pertinent family history  I have reviewed and agree with the history as documented      E-Cigarette/Vaping    E-Cigarette Use Never User      E-Cigarette/Vaping Substances     Social History     Tobacco Use    Smoking status: Never Smoker    Smokeless tobacco: Never Used   Substance Use Topics    Alcohol use: Never     Frequency: Never    Drug use: Never       Review of Systems   Constitutional: Negative for fever  Respiratory: Positive for shortness of breath  Negative for cough  Cardiovascular: Positive for chest pain  Gastrointestinal: Negative for nausea and vomiting  All other systems reviewed and are negative  Physical Exam  Physical Exam  Vitals signs and nursing note reviewed  Constitutional:       General: She is in acute distress (mild)  HENT:      Head: Normocephalic and atraumatic  Mouth/Throat:      Mouth: Mucous membranes are moist    Eyes:      General: No scleral icterus  Neck:      Musculoskeletal: Normal range of motion and neck supple  Cardiovascular:      Rate and Rhythm: Normal rate and regular rhythm  Heart sounds: Normal heart sounds  No murmur  Pulmonary:      Effort: Pulmonary effort is normal  No respiratory distress  Breath sounds: Normal breath sounds  No stridor  No wheezing, rhonchi or rales  Chest:      Chest wall: Tenderness (midline upper) present  Abdominal:      General: Bowel sounds are normal  There is no distension  Palpations: Abdomen is soft  Tenderness: There is no abdominal tenderness  Musculoskeletal:         General: No deformity  Right lower leg: No edema  Left lower leg: No edema  Skin:     General: Skin is warm and dry  Findings: No erythema or rash  Neurological:      General: No focal deficit present  Mental Status: She is alert and oriented to person, place, and time     Psychiatric:         Mood and Affect: Mood normal          Vital Signs  ED Triage Vitals [05/21/21 0633]   Temperature Pulse Respirations Blood Pressure SpO2   98 3 °F (36 8 °C) 86 18 124/65 99 %      Temp Source Heart Rate Source Patient Position - Orthostatic VS BP Location FiO2 (%)   Oral Monitor Sitting Left arm --      Pain Score       6           Vitals: 05/21/21 0633 05/21/21 0721   BP: 124/65 115/80   Pulse: 86 71   Patient Position - Orthostatic VS: Sitting Lying         Visual Acuity      ED Medications  Medications   acetaminophen (TYLENOL) tablet 975 mg (975 mg Oral Given 5/21/21 0717)   famotidine (PEPCID) injection 20 mg (20 mg Intravenous Given 5/21/21 0718)       Diagnostic Studies  Results Reviewed     Procedure Component Value Units Date/Time    hCG, qualitative pregnancy [[id number]]  (Normal) Collected: 05/21/21 0724    Lab Status: Final result Specimen: Blood Updated: 05/21/21 0803     Preg, Serum Negative    Lipase [[id number]]  (Normal) Collected: 05/21/21 0724    Lab Status: Final result Specimen: Blood Updated: 05/21/21 0759     Lipase 100 u/L     CK Total with Reflex CKMB [[id number]]  (Normal) Collected: 05/21/21 0724    Lab Status: Final result Specimen: Blood Updated: 05/21/21 0759     Total  U/L     D-Dimer [[id number]]  (Normal) Collected: 05/21/21 0717    Lab Status: Final result Specimen: Blood from Arm, Right Updated: 05/21/21 0743     D-Dimer, Quant <0 27 ug/ml FEU     Protime-INR [[id number]]  (Normal) Collected: 05/21/21 0717    Lab Status: Final result Specimen: Blood from Arm, Right Updated: 05/21/21 0740     Protime 12 3 seconds      INR 0 90    APTT [[id number]]  (Normal) Collected: 05/21/21 0717    Lab Status: Final result Specimen: Blood from Arm, Right Updated: 05/21/21 0740     PTT 29 seconds     Comprehensive metabolic panel [[id number]]  (Abnormal) Collected: 05/21/21 0724    Lab Status: Final result Specimen: Blood Updated: 05/21/21 0731     Sodium 139 mmol/L      Potassium 4 3 mmol/L      Chloride 106 mmol/L      CO2 24 mmol/L      ANION GAP 9 mmol/L      BUN 16 mg/dL      Creatinine 0 75 mg/dL      Glucose 77 mg/dL      Calcium 8 2 mg/dL      AST 11 U/L      ALT 15 U/L      Alkaline Phosphatase 93 U/L      Total Protein 7 2 g/dL      Albumin 3 7 g/dL      Total Bilirubin 0 16 mg/dL      eGFR 114 ml/min/1 73sq m Narrative:      National Kid[employer] Disease Foundation guidelines for Chronic Kidney Disease (CKD):     Stage 1 with normal or high GFR (GFR > 90 mL/min/1 73 square meters)    Stage 2 Mild CKD (GFR = 60-89 mL/min/1 73 square meters)    Stage 3A Moderate CKD (GFR = 45-59 mL/min/1 73 square meters)    Stage 3B Moderate CKD (GFR = 30-44 mL/min/1 73 square meters)    Stage 4 Severe CKD (GFR = 15-29 mL/min/1 73 square meters)    Stage 5 End Stage CKD (GFR <15 mL/min/1 73 square meters)  Note: GFR calculation is accurate only with a steady state creatinine    Troponin I [[id number]]  (Normal) Collected: 05/21/21 0724    Lab Status: Final result Specimen: Blood Updated: 05/21/21 0725     Troponin I <0 02 ng/mL     CBC and differential [[id number]]  (Abnormal) Resulted: 05/21/21 0652    Lab Status: Final result Specimen: Blood Updated: 05/21/21 0652     WBC 5 73 Thousand/uL      RBC 4 32 Million/uL      Hemoglobin 13 3 g/dL      Hematocrit 41 2 %      MCV 95 fL      MCH 30 8 pg      MCHC 32 3 g/dL      RDW 12 0 %      MPV 8 6 fL      Platelets 897 Thousands/uL      nRBC 0 /100 WBCs      Neutrophils Relative 56 %      Immat GRANS % 0 %      Lymphocytes Relative 32 %      Monocytes Relative 8 %      Eosinophils Relative 3 %      Basophils Relative 1 %      Neutrophils Absolute 3 20 Thousands/µL      Immature Grans Absolute 0 01 Thousand/uL      Lymphocytes Absolute 1 83 Thousands/µL      Monocytes Absolute 0 48 Thousand/µL      Eosinophils Absolute 0 16 Thousand/µL      Basophils Absolute 0 05 Thousands/µL                  XR chest 1 view portable   ED Interpretation by Basilia Bain MD (05/21 7881)   No acute disease read by me                    Procedures  ECG 12 Lead Documentation Only    Date/Time: 5/21/2021 6:49 AM  Performed by: Basilia Bain MD  Authorized by: Basilia Bain MD     Indications / Diagnosis:  Chest pain  ECG reviewed by me, the ED Provider: yes    Patient location:  ED  Previous ECG: Previous ECG:  Compared to current    Comparison ECG info:  9/21/20    Similarity:  No change  Rate:     ECG rate:  82    ECG rate assessment: normal    Rhythm:     Rhythm: sinus rhythm      Rhythm comment:  S  arrhythmia  Ectopy:     Ectopy: none    QRS:     QRS axis:  Normal    QRS intervals:  Normal  Conduction:     Conduction: normal    ST segments:     ST segments:  Normal  T waves:     T waves: normal               ED Course  ED Course as of May 21 0804   Fri May 21, 2021   0726 EKG d/w patient  0750 Labs and CXR d/w patient                   HEART Risk Score      Most Recent Value   Heart Score Risk Calculator   History  0 Filed at: 05/21/2021 0731   ECG  0 Filed at: 05/21/2021 0731   Age  0 Filed at: 05/21/2021 0731   Risk Factors  0 Filed at: 05/21/2021 0731   Troponin  0 Filed at: 05/21/2021 0731   HEART Score  0 Filed at: 05/21/2021 0731              PERC Rule for PE      Most Recent Value   PERC Rule for PE   Age >=50  0 Filed at: 05/21/2021 0732   HR >=100  0 Filed at: 05/21/2021 0732   O2 Sat on room air < 95%  0 Filed at: 05/21/2021 0732   History of PE or DVT  0 Filed at: 05/21/2021 0732   Recent trauma or surgery  0 Filed at: 05/21/2021 0732   Hemoptysis  0 Filed at: 05/21/2021 0732   Exogenous estrogen  0 Filed at: 05/21/2021 0732   Unilateral leg swelling  0 Filed at: 05/21/2021 0732   PERC Rule for PE Results  0 Filed at: 05/21/2021 0732                JOSELIN Risk Score      Most Recent Value   Age >= 72  0 Filed at: 05/21/2021 0732   Known CAD (stenosis >= 50%)  0 Filed at: 05/21/2021 0732   Recent (<=24 hrs) Service Angina  0 Filed at: 05/21/2021 0732   ST Deviation >= 0 5 mm  0 Filed at: 05/21/2021 0732   3+ CAD Risk Factors (FHx, HTN, HLP, DM, Smoker)  0 Filed at: 05/21/2021 0732   Aspirin Use Past 7 Days  0 Filed at: 05/21/2021 0732   Elevated Cardiac Markers  0 Filed at: 05/21/2021 0732   JOSELIN Risk Score (Calculated)  0 Filed at: 05/21/2021 8343        Wells' Criteria for PE      Most Recent Value   Wells' Criteria for PE   Clinical signs and symptoms of DVT  0 Filed at: 05/21/2021 2482   PE is primary diagnosis or equally likely  3 Filed at: 05/21/2021 0732   HR >100  0 Filed at: 05/21/2021 0732   Immobilization at least 3 days or Surgery in the previous 4 weeks  0 Filed at: 05/21/2021 0732   Previous, objectively diagnosed PE or DVT  0 Filed at: 05/21/2021 0732   Hemoptysis  0 Filed at: 05/21/2021 3637   Malignancy with treatment within 6 months or palliative  0 Filed at: 05/21/2021 5656   Wells' Criteria Total  3 Filed at: 05/21/2021 3416                MDM  Number of Diagnoses or Management Options  Diagnosis management comments: DDX including but not limited to: chest wall pain, pleurisy, costochondritis, pericarditis, myocarditis, PTX, pneumonia, PE, GI etiology; doubt ACS or MI or dissection or rhabdomyolysis  Amount and/or Complexity of Data Reviewed  Clinical lab tests: ordered and reviewed  Tests in the radiology section of CPT®: ordered and reviewed  Decide to obtain previous medical records or to obtain history from someone other than the patient: yes  Review and summarize past medical records: yes  Independent visualization of images, tracings, or specimens: yes        Disposition  Final diagnoses:   Chest wall pain   Pleurisy     Time reflects when diagnosis was documented in both MDM as applicable and the Disposition within this note     Time User Action Codes Description Comment    5/21/2021  8:01 AM Laila Or Add [R07 89] Chest wall pain     5/21/2021  8:01 AM Laila Or Add [R09 1] Pleurisy       ED Disposition     ED Disposition Condition Date/Time Comment    Discharge Stable Fri May 21, 2021  8:01 AM [facility] [place] discharge to home/self care  Follow-up Information     Follow up With Specialties Details Why Charley Rosas  Call in 1 day motrin/tylenol for pain   Return sooner if increased pain, fever, vomiting, worsening difficulty breathing, rash  Patient's Medications   Discharge Prescriptions    No medications on file     No discharge procedures on file      PDMP Review       Value Time User    PDMP Reviewed  Yes 5/21/2021  6:50 AM Juan Bagley MD          ED Provider  Electronically Signed by           Juan Bagley MD  05/21/21 7423

## 2021-05-21 NOTE — Clinical Note
María Checo was seen and treated in our emergency department on 5/21/2021  Diagnosis:     Nusrat Wisdom  may return to work on return date  She may return on this date: 05/22/2021         If you have any questions or concerns, please don't hesitate to call        Yaya Sauer MD    ______________________________           _______________          _______________  Hospital Representative                              Date                                Time

## 2021-05-24 ENCOUNTER — HOSPITAL ENCOUNTER (EMERGENCY)
Facility: HOSPITAL | Age: 21
Discharge: HOME | End: 2021-05-24
Attending: EMERGENCY MEDICINE | Admitting: EMERGENCY MEDICINE
Payer: COMMERCIAL

## 2021-05-24 ENCOUNTER — APPOINTMENT (EMERGENCY)
Dept: RADIOLOGY | Facility: HOSPITAL | Age: 21
End: 2021-05-24
Payer: COMMERCIAL

## 2021-05-24 VITALS
TEMPERATURE: 98.4 F | SYSTOLIC BLOOD PRESSURE: 114 MMHG | RESPIRATION RATE: 18 BRPM | WEIGHT: 230 LBS | OXYGEN SATURATION: 100 % | BODY MASS INDEX: 38.32 KG/M2 | HEIGHT: 65 IN | DIASTOLIC BLOOD PRESSURE: 71 MMHG | HEART RATE: 74 BPM

## 2021-05-24 DIAGNOSIS — R10.9 ABDOMINAL PAIN: Primary | ICD-10-CM

## 2021-05-24 LAB
ALBUMIN SERPL BCP-MCNC: 3.7 G/DL (ref 3.5–5)
ALP SERPL-CCNC: 72 U/L (ref 46–116)
ALT SERPL W P-5'-P-CCNC: 21 U/L (ref 12–78)
ANION GAP SERPL CALCULATED.3IONS-SCNC: 5 MMOL/L (ref 4–13)
AST SERPL W P-5'-P-CCNC: 12 U/L (ref 5–45)
ATRIAL RATE: 67 BPM
BASOPHILS # BLD AUTO: 0.07 THOUSANDS/ΜL (ref 0–0.1)
BASOPHILS NFR BLD AUTO: 1 % (ref 0–1)
BILIRUB SERPL-MCNC: 0.22 MG/DL (ref 0.2–1)
BUN SERPL-MCNC: 15 MG/DL (ref 5–25)
CALCIUM SERPL-MCNC: 8.4 MG/DL (ref 8.3–10.1)
CHLORIDE SERPL-SCNC: 107 MMOL/L (ref 100–108)
CO2 SERPL-SCNC: 28 MMOL/L (ref 21–32)
CREAT SERPL-MCNC: 0.72 MG/DL (ref 0.6–1.3)
EOSINOPHIL # BLD AUTO: 0.21 THOUSAND/ΜL (ref 0–0.61)
EOSINOPHIL NFR BLD AUTO: 3 % (ref 0–6)
ERYTHROCYTE [DISTWIDTH] IN BLOOD BY AUTOMATED COUNT: 12.2 % (ref 11.6–15.1)
EXT PREG TEST URINE: NEGATIVE
EXT. CONTROL ED NAV: NORMAL
GFR SERPL CREATININE-BSD FRML MDRD: 119 ML/MIN/1.73SQ M
GLUCOSE SERPL-MCNC: 99 MG/DL (ref 65–140)
HCT VFR BLD AUTO: 41.2 % (ref 34.8–46.1)
HGB BLD-MCNC: 13.7 G/DL (ref 11.5–15.4)
IMM GRANULOCYTES # BLD AUTO: 0.02 THOUSAND/UL (ref 0–0.2)
IMM GRANULOCYTES NFR BLD AUTO: 0 % (ref 0–2)
LIPASE SERPL-CCNC: 86 U/L (ref 73–393)
LYMPHOCYTES # BLD AUTO: 3.36 THOUSANDS/ΜL (ref 0.6–4.47)
LYMPHOCYTES NFR BLD AUTO: 46 % (ref 14–44)
MCH RBC QN AUTO: 31 PG (ref 26.8–34.3)
MCHC RBC AUTO-ENTMCNC: 33.3 G/DL (ref 31.4–37.4)
MCV RBC AUTO: 93 FL (ref 82–98)
MONOCYTES # BLD AUTO: 0.54 THOUSAND/ΜL (ref 0.17–1.22)
MONOCYTES NFR BLD AUTO: 7 % (ref 4–12)
NEUTROPHILS # BLD AUTO: 3.16 THOUSANDS/ΜL (ref 1.85–7.62)
NEUTS SEG NFR BLD AUTO: 43 % (ref 43–75)
NRBC BLD AUTO-RTO: 0 /100 WBCS
P AXIS: 52 DEGREES
PLATELET # BLD AUTO: 296 THOUSANDS/UL (ref 149–390)
PMV BLD AUTO: 8.8 FL (ref 8.9–12.7)
POTASSIUM SERPL-SCNC: 4.4 MMOL/L (ref 3.5–5.3)
PR INTERVAL: 168 MS
PROT SERPL-MCNC: 6.9 G/DL (ref 6.4–8.2)
QRS AXIS: 73 DEGREES
QRSD INTERVAL: 76 MS
QT INTERVAL: 390 MS
QTC INTERVAL: 403 MS
RBC # BLD AUTO: 4.42 MILLION/UL (ref 3.81–5.12)
SODIUM SERPL-SCNC: 140 MMOL/L (ref 136–145)
T WAVE AXIS: 57 DEGREES
VENTRICULAR RATE: 64 BPM
WBC # BLD AUTO: 7.36 THOUSAND/UL (ref 4.31–10.16)

## 2021-05-24 PROCEDURE — 71045 X-RAY EXAM CHEST 1 VIEW: CPT

## 2021-05-24 PROCEDURE — 93010 ELECTROCARDIOGRAM REPORT: CPT | Performed by: INTERNAL MEDICINE

## 2021-05-24 PROCEDURE — 83690 ASSAY OF LIPASE: CPT | Performed by: PHYSICIAN ASSISTANT

## 2021-05-24 PROCEDURE — 93005 ELECTROCARDIOGRAM TRACING: CPT

## 2021-05-24 PROCEDURE — 36415 COLL VENOUS BLD VENIPUNCTURE: CPT | Performed by: PHYSICIAN ASSISTANT

## 2021-05-24 PROCEDURE — 96374 THER/PROPH/DIAG INJ IV PUSH: CPT

## 2021-05-24 PROCEDURE — 81025 URINE PREGNANCY TEST: CPT | Performed by: PHYSICIAN ASSISTANT

## 2021-05-24 PROCEDURE — 85025 COMPLETE CBC W/AUTO DIFF WBC: CPT | Performed by: PHYSICIAN ASSISTANT

## 2021-05-24 PROCEDURE — C9113 INJ PANTOPRAZOLE SODIUM, VIA: HCPCS | Performed by: PHYSICIAN ASSISTANT

## 2021-05-24 PROCEDURE — 99285 EMERGENCY DEPT VISIT HI MDM: CPT | Performed by: PHYSICIAN ASSISTANT

## 2021-05-24 PROCEDURE — 80053 COMPREHEN METABOLIC PANEL: CPT | Performed by: PHYSICIAN ASSISTANT

## 2021-05-24 PROCEDURE — 99285 EMERGENCY DEPT VISIT HI MDM: CPT

## 2021-05-24 RX ORDER — LIDOCAINE HYDROCHLORIDE 20 MG/ML
15 SOLUTION OROPHARYNGEAL ONCE
Status: COMPLETED | OUTPATIENT
Start: 2021-05-24 | End: 2021-05-24

## 2021-05-24 RX ORDER — MAGNESIUM HYDROXIDE/ALUMINUM HYDROXICE/SIMETHICONE 120; 1200; 1200 MG/30ML; MG/30ML; MG/30ML
30 SUSPENSION ORAL ONCE
Status: COMPLETED | OUTPATIENT
Start: 2021-05-24 | End: 2021-05-24

## 2021-05-24 RX ORDER — SUCRALFATE 1 G/1
1 TABLET ORAL 2 TIMES DAILY
Qty: 14 TABLET | Refills: 0 | Status: SHIPPED | OUTPATIENT
Start: 2021-05-24 | End: 2021-05-31

## 2021-05-24 RX ORDER — PANTOPRAZOLE SODIUM 40 MG/1
40 INJECTION, POWDER, FOR SOLUTION INTRAVENOUS ONCE
Status: COMPLETED | OUTPATIENT
Start: 2021-05-24 | End: 2021-05-24

## 2021-05-24 RX ORDER — SUCRALFATE 1 G/1
1 TABLET ORAL ONCE
Status: COMPLETED | OUTPATIENT
Start: 2021-05-24 | End: 2021-05-24

## 2021-05-24 RX ORDER — FAMOTIDINE 20 MG/1
20 TABLET, FILM COATED ORAL 2 TIMES DAILY
Qty: 30 TABLET | Refills: 0 | Status: SHIPPED | OUTPATIENT
Start: 2021-05-24

## 2021-05-24 RX ADMIN — LIDOCAINE HYDROCHLORIDE 15 ML: 20 SOLUTION ORAL; TOPICAL at 06:08

## 2021-05-24 RX ADMIN — ALUMINA, MAGNESIA, AND SIMETHICONE ORAL SUSPENSION REGULAR STRENGTH 30 ML: 1200; 1200; 120 SUSPENSION ORAL at 06:08

## 2021-05-24 RX ADMIN — PANTOPRAZOLE SODIUM 40 MG: 40 INJECTION, POWDER, FOR SOLUTION INTRAVENOUS at 06:29

## 2021-05-24 RX ADMIN — SUCRALFATE 1 G: 1 TABLET ORAL at 06:39

## 2021-05-24 NOTE — Clinical Note
Aung Espinal was seen and treated in our emergency department on 5/24/2021  Diagnosis:     Vincent Garibay  may return to work on return date  She may return on this date: 05/25/2021         If you have any questions or concerns, please don't hesitate to call        Merritt Mejia PA-C    ______________________________           _______________          _______________  Hospital Representative                              Date                                Time

## 2021-05-26 NOTE — ED PROVIDER NOTES
EMERGENCY MEDICINE NOTE        PATIENT IDENTIFICATION PHYSICIAN/SERVICE INFORMATION   Name: Shahram Harry  MRN: [id number]  YOB: 2000  Age/Sex: 24 y o  female  Preferred Language: English  Code Status: No Order  Encounter Date: 5/24/2021  Attending Physician: Misha Magdaleno MD  Admitting Physician: No admitting provider for patient encounter  Primary Care Physician: Jayden Kenney MD         Primary Care Phone: [phone] [facility]     Chief Complaint   Patient presents with    Chest Pain     Pt comes to ED for mid sternal chest pain that radiates to R chest/abdomen  +SOB         HISTORY OF PRESENT ILLNESS     Shahram Harry is a 24 y o  female who presents due to Epigastric/Chest Pain  Pt reports pain onset 5 days ago, intermittent sharp pain worse with deep breaths  Pt evaluated 3 days ago with ekg, cxr, blood labs without acute findings including negative D-dimer, troponin  No other palliative provocative factors, specifically not made worse with eating, though notes has late meals typically before bed  Denies drug use, heart problems, FHx heart problems, and no other complaints at this time  History provided by:  Patient   used: No    Chest Pain  Associated symptoms: abdominal pain and nausea    Associated symptoms: no cough, no diaphoresis, no dizziness, no dysphagia, no fatigue, no fever, no headache, no numbness, no palpitations, no shortness of breath, not vomiting and no weakness          PAST MEDICAL AND SURGICAL HISTORY     Past Medical History:   Diagnosis Date    ADHD     Anxiety     Asthma     Depression     Psychiatric disorder        Past Surgical History:   Procedure Laterality Date    CHOLECYSTECTOMY      TONSILLECTOMY         History reviewed  No pertinent family history      E-Cigarette/Vaping    E-Cigarette Use Never User      E-Cigarette/Vaping Substances     Social History     Tobacco Use    Smoking status: Never Smoker    Smokeless tobacco: Never Used   Substance Use Topics    Alcohol use: Never     Frequency: Never    Drug use: Never         ALLERGIES     Allergies   Allergen Reactions    Penicillins Hives     Category: Allergy;            HOME MEDICATIONS     Prior to Admission Medications   Prescriptions Last Dose Informant Patient Reported? Taking? Cholecalciferol 50 MCG (2000 UT) CAPS 5/23/2021 at Unknown time  Yes Yes   Sig: Take by mouth   albuterol (ProAir HFA) 90 mcg/act inhaler   Yes No   Sig: Inhale 2 puffs every 4 (four) hours   amphetamine-dextroamphetamine (ADDERALL XR, 30MG,) 30 MG 24 hr capsule Not Taking at Unknown time  Yes No   Sig: Take 1 capsule by mouth every 24 hours   fluticasone-salmeterol (Advair Diskus) 100-50 mcg/dose inhaler 5/23/2021 at Unknown time  Yes Yes   Sig: Inhale      Facility-Administered Medications: None         REVIEW OF SYSTEMS     Review of Systems   Constitutional: Negative for activity change, appetite change, chills, diaphoresis, fatigue and fever  HENT: Negative for sore throat and trouble swallowing  Eyes: Negative for visual disturbance  Respiratory: Negative for apnea, cough, choking, chest tightness, shortness of breath, wheezing and stridor  Cardiovascular: Positive for chest pain  Negative for palpitations and leg swelling  Gastrointestinal: Positive for abdominal pain and nausea  Negative for abdominal distention, constipation, diarrhea and vomiting  Genitourinary: Negative for decreased urine volume, difficulty urinating, dysuria, flank pain, frequency and hematuria  Musculoskeletal: Negative for neck pain  Skin: Negative for rash  Neurological: Negative for dizziness, weakness, light-headedness, numbness and headaches  Psychiatric/Behavioral: The patient is not nervous/anxious            PHYSICAL EXAMINATION     ED Triage Vitals   Temperature Pulse Respirations Blood Pressure SpO2   05/24/21 0512 05/24/21 0514 05/24/21 4983 05/24/21 8940 05/24/21 3989 98 4 °F (36 9 °C) 78 18 129/80 99 %      Temp Source Heart Rate Source Patient Position - Orthostatic VS BP Location FiO2 (%)   05/24/21 0512 05/24/21 0512 05/24/21 0512 05/24/21 0512 --   Oral Monitor Sitting Right arm       Pain Score       05/24/21 0512       8         Wt Readings from Last 3 Encounters:   05/24/21 104 kg (230 lb)   05/21/21 105 kg (230 lb 9 6 oz)   09/21/20 110 kg (242 lb)         Physical Exam  Vitals signs and nursing note reviewed  Constitutional:       General: She is not in acute distress  Appearance: She is well-developed  She is not ill-appearing, toxic-appearing or diaphoretic  HENT:      Head: Normocephalic and atraumatic  Jaw: No trismus  Right Ear: Hearing, tympanic membrane, ear canal and external ear normal  No decreased hearing noted  No laceration, drainage, swelling or tenderness  No middle ear effusion  No foreign body  No mastoid tenderness  No hemotympanum  Tympanic membrane is not injected, scarred, perforated, erythematous, retracted or bulging  Left Ear: Hearing, tympanic membrane, ear canal and external ear normal  No decreased hearing noted  No laceration, drainage, swelling or tenderness  No middle ear effusion  No foreign body  No mastoid tenderness  No hemotympanum  Tympanic membrane is not injected, scarred, perforated, erythematous, retracted or bulging  Nose: Nose normal       Right Sinus: No maxillary sinus tenderness or frontal sinus tenderness  Left Sinus: No maxillary sinus tenderness or frontal sinus tenderness  Mouth/Throat:      Pharynx: Uvula midline  No oropharyngeal exudate, posterior oropharyngeal erythema or uvula swelling  Tonsils: No tonsillar exudate or tonsillar abscesses  1+ on the right  1+ on the left  Eyes:      General:         Right eye: No discharge  Left eye: No discharge  Conjunctiva/sclera: Conjunctivae normal       Pupils: Pupils are equal, round, and reactive to light     Neck: Musculoskeletal: Normal range of motion and neck supple  Vascular: No carotid bruit, hepatojugular reflux or JVD  Cardiovascular:      Rate and Rhythm: Normal rate and regular rhythm  No extrasystoles are present  Chest Wall: PMI is not displaced  Pulses: Normal pulses  Radial pulses are 2+ on the right side and 2+ on the left side  Dorsalis pedis pulses are 2+ on the right side and 2+ on the left side  Posterior tibial pulses are 2+ on the right side and 2+ on the left side  Heart sounds: Normal heart sounds  No murmur  No friction rub  No gallop  No S3 or S4 sounds  Pulmonary:      Effort: Pulmonary effort is normal  No respiratory distress  Breath sounds: Normal breath sounds  No stridor  No decreased breath sounds, wheezing, rhonchi or rales  Chest:      Chest wall: No tenderness  Abdominal:      General: Bowel sounds are normal  There is no distension  Palpations: Abdomen is soft  Abdomen is not rigid  There is no mass  Tenderness: There is abdominal tenderness (mild epigastric)  There is no right CVA tenderness, left CVA tenderness, guarding or rebound  Negative signs include Cunningham's sign and McBurney's sign  Hernia: No hernia is present  Comments: Negative Cunningham's  Negative Appendiceal signs (Psoas, Rovsing's, Obturator)  Negative Peritoneal Signs   Musculoskeletal: Normal range of motion  Right lower leg: She exhibits no tenderness  No edema  Left lower leg: No edema  Lymphadenopathy:      Cervical: No cervical adenopathy  Skin:     General: Skin is warm and dry  Capillary Refill: Capillary refill takes less than 2 seconds  Neurological:      Mental Status: She is alert and oriented to person, place, and time             DIAGNOSTIC RESULTS     Laboratory results:    Labs Reviewed   CBC AND DIFFERENTIAL - Abnormal       Result Value Ref Range Status    WBC 7 36  4 31 - 10 16 Thousand/uL Final    RBC 4 42 3 81 - 5 12 Million/uL Final    Hemoglobin 13 7  11 5 - 15 4 g/dL Final    Hematocrit 41 2  34 8 - 46 1 % Final    MCV 93  82 - 98 fL Final    MCH 31 0  26 8 - 34 3 pg Final    MCHC 33 3  31 4 - 37 4 g/dL Final    RDW 12 2  11 6 - 15 1 % Final    MPV 8 8 (*) 8 9 - 12 7 fL Final    Platelets 988  171 - 390 Thousands/uL Final    nRBC 0  /100 WBCs Final    Neutrophils Relative 43  43 - 75 % Final    Immat GRANS % 0  0 - 2 % Final    Lymphocytes Relative 46 (*) 14 - 44 % Final    Monocytes Relative 7  4 - 12 % Final    Eosinophils Relative 3  0 - 6 % Final    Basophils Relative 1  0 - 1 % Final    Neutrophils Absolute 3 16  1 85 - 7 62 Thousands/µL Final    Immature Grans Absolute 0 02  0 00 - 0 20 Thousand/uL Final    Lymphocytes Absolute 3 36  0 60 - 4 47 Thousands/µL Final    Monocytes Absolute 0 54  0 17 - 1 22 Thousand/µL Final    Eosinophils Absolute 0 21  0 00 - 0 61 Thousand/µL Final    Basophils Absolute 0 07  0 00 - 0 10 Thousands/µL Final   LIPASE - Normal    Lipase 86  73 - 393 u/L Final   POCT PREGNANCY, URINE - Normal    EXT PREG TEST UR (Ref: Negative) negative   Final    Control valid   Final   COMPREHENSIVE METABOLIC PANEL    Sodium 424  136 - 145 mmol/L Final    Potassium 4 4  3 5 - 5 3 mmol/L Final    Chloride 107  100 - 108 mmol/L Final    CO2 28  21 - 32 mmol/L Final    ANION GAP 5  4 - 13 mmol/L Final    BUN 15  5 - 25 mg/dL Final    Creatinine 0 72  0 60 - 1 30 mg/dL Final    Comment: Standardized to IDMS reference method    Glucose 99  65 - 140 mg/dL Final    Comment: If the patient is fasting, the ADA then defines impaired fasting glucose as > 100 mg/dL and diabetes as > or equal to 123 mg/dL  Specimen collection should occur prior to Sulfasalazine administration due to the potential for falsely depressed results  Specimen collection should occur prior to Sulfapyridine administration due to the potential for falsely elevated results      Calcium 8 4  8 3 - 10 1 mg/dL Final    AST 12  5 - 45 U/L Final    Comment: Specimen collection should occur prior to Sulfasalazine administration due to the potential for falsely depressed results  ALT 21  12 - 78 U/L Final    Comment: Specimen collection should occur prior to Sulfasalazine administration due to the potential for falsely depressed results  Alkaline Phosphatase 72  46 - 116 U/L Final    Total Protein 6 9  6 4 - 8 2 g/dL Final    Albumin 3 7  3 5 - 5 0 g/dL Final    Total Bilirubin 0 22  0 20 - 1 00 mg/dL Final    Comment: Use of this assay is not recommended for patients undergoing treatment with eltrombopag due to the potential for falsely elevated results  eGFR 119  ml/min/1 73sq m Final    Narrative:     [place] guidelines for Chronic Kidney Disease (CKD):     Stage 1 with normal or high GFR (GFR > 90 mL/min/1 73 square meters)    Stage 2 Mild CKD (GFR = 60-89 mL/min/1 73 square meters)    Stage 3A Moderate CKD (GFR = 45-59 mL/min/1 73 square meters)    Stage 3B Moderate CKD (GFR = 30-44 mL/min/1 73 square meters)    Stage 4 Severe CKD (GFR = 15-29 mL/min/1 73 square meters)    Stage 5 End Stage CKD (GFR <15 mL/min/1 73 square meters)  Note: GFR calculation is accurate only with a steady state creatinine       All labs reviewed and utilized in the medical decision making process    Radiology results:    XR chest 1 view portable   ED Interpretation   No acute cardiopulmonary disease      Final Result      No acute cardiopulmonary disease  Workstation performed: [id number]             All radiology studies independently viewed by me and interpreted by the radiologist       PROCEDURES     ECG 12 Lead Documentation Only    Date/Time: 5/24/2021 5:55 AM  Performed by: Reggie Hall PA-C  Authorized by:  Reggie Hall PA-C     Indications / Diagnosis:  Chest pain  ECG reviewed by me, the ED Provider: yes    Patient location:  ED  Previous ECG:     Previous ECG:  Compared to current    Similarity:  No change    Comparison to cardiac monitor: Yes    Interpretation:     Interpretation: normal    Rate:     ECG rate assessment: normal    Rhythm:     Rhythm: sinus rhythm    Ectopy:     Ectopy: none    QRS:     QRS axis:  Normal    QRS intervals:  Normal  Conduction:     Conduction: normal    ST segments:     ST segments:  Normal  T waves:     T waves: normal            Invasive Devices     None                 ASSESSMENT AND PLAN     MDM  Number of Diagnoses or Management Options  Abdominal pain: established, improving     Amount and/or Complexity of Data Reviewed  Clinical lab tests: ordered and reviewed  Tests in the radiology section of CPT®: ordered and reviewed  Tests in the medicine section of CPT®: reviewed and ordered  Obtain history from someone other than the patient: yes  Review and summarize past medical records: yes  Independent visualization of images, tracings, or specimens: yes    Risk of Complications, Morbidity, and/or Mortality  Presenting problems: moderate  Diagnostic procedures: moderate  Management options: moderate    Patient Progress  Patient progress: improved (Notes improvement of pain with GI cocktail  NAD  )      Initial ED assessment:  Hasmukh Rosado is a 24 y o  female with no significant PMH who presents with Chest/Epigastric Pain  Vitals signs reviewed and WNL  Physical examination is remarkable for mild epigastric TTP    Initial Ddx  includes but is not limited to:   , gastroenteritis, gastritis, PUD, GERD, cholecystitis, biliary colic, choledocholithiasis, doubt cardiac etiology, respiratory etiology, appendicitis, gastroparesis, hepatitis, pancreatitis, colitis, enteritis, food poisoning, mesenteric adenitis, IBD, IBS, ileus, bowel obstruction, volvulus, perforated viscus, splenic etiology, diverticulitis, internal hernia, constipation, pelvic pathology, renal colic, pyelonephritis, UTI      Initial ED plan:   Plan will be to perform diagnostic testing of Blood labs, Urinalysis, EKG and XR of chest and treat symptomatically   Final ED summary/disposition: Discussed results of diagnostic testing with Patient in detail  Greater than 10 minutes of education regarding diagnosis, testing, and ample opportunity for questions  Home care recommendations given with discharge paperwork  Return to ED instructions given if new/worsening sxs  Verbalized understanding  MDM  Reviewed: previous chart, nursing note and vitals  Reviewed previous: labs, ECG and x-ray  Interpretation: labs, x-ray and ECG          ED COURSE OF CARE AND REASSESSMENT                                          Medications   Lidocaine Viscous HCl (XYLOCAINE) 2 % mucosal solution 15 mL (15 mL Swish & Swallow Given 5/24/21 4419)   pantoprazole (PROTONIX) injection 40 mg (40 mg Intravenous Given 5/24/21 5271)   aluminum-magnesium hydroxide-simethicone (MYLANTA) oral suspension 30 mL (30 mL Oral Given 5/24/21 0608)   sucralfate (CARAFATE) tablet 1 g (1 g Oral Given 5/24/21 0639)         FINAL IMPRESSION     Final diagnoses:   Abdominal pain         DISPOSITION AND PLANNING     Time reflects when diagnosis was documented in both MDM as applicable and the Disposition within this note     Time User Action Codes Description Comment    5/24/2021  6:45 AM [place] [facility] Add [R10 9] Abdominal pain       ED Disposition     ED Disposition Condition Date/Time Comment    Discharge Stable Mon May 24, 2021  6:45 AM Meenakshi Quezada discharge to home/self care              Follow-up Information     Follow up With Specialties Details Why Contact Info Additional Information    Nina Hammer MD Pediatrics Call in 1 day For follow up [facility] [address]       [facility] [place] [facility] Call  For follow up Zach Marks [place] [phone] [facility] [place] [address] [place], [place], [address]   [phone]    [address] Emergency Department Emergency Medicine Go to  If symptoms worsen [address] [facility] [address] [facility] Emergency Department, [address], [place], [address], [address] [place], [address] [place] [facility]  [phone]    Call in 1 day  For follow up    [facility]   [phone]  Call   For follow up    [address] Emergency Department  [address] [facility] [address]  [phone]  Go to   If symptoms worsen        DISCHARGE MEDICATIONS     Discharge Medication List as of 5/24/2021  6:46 AM      START taking these medications    Details   famotidine (PEPCID) 20 mg tablet Take 1 tablet (20 mg total) by mouth 2 (two) times a day, Starting Mon 5/24/2021, Normal      sucralfate (CARAFATE) 1 g tablet Take 1 tablet (1 g total) by mouth 2 (two) times a day for 7 days, Starting Mon 5/24/2021, Until Mon 5/31/2021, Normal         CONTINUE these medications which have NOT CHANGED    Details   Cholecalciferol 50 MCG (2000 UT) CAPS Take by mouth, Historical Med      fluticasone-salmeterol (Advair Diskus) 100-50 mcg/dose inhaler Inhale, Starting Thu 9/24/2009, Historical Med      albuterol (ProAir HFA) 90 mcg/act inhaler Inhale 2 puffs every 4 (four) hours, Starting Thu 1/21/2010, Historical Med      amphetamine-dextroamphetamine (ADDERALL XR, 30MG,) 30 MG 24 hr capsule Take 1 capsule by mouth every 24 hours, Starting Mon 3/22/2010, Historical Med             No discharge procedures on file      PDMP Review       Value Time User    PDMP Reviewed  Yes 5/21/2021  6:50 AM MD Peña Godinez PA-C Ronni Come, PA-C  05/26/21 8102

## 2021-06-07 ENCOUNTER — TELEPHONE (OUTPATIENT)
Dept: PEDIATRICS CLINIC | Facility: CLINIC | Age: 21
End: 2021-06-07

## 2021-06-30 NOTE — TELEPHONE ENCOUNTER
06/30/21 11:37 AM     Thank you for your request  Your request has been received, reviewed, and the patient chart updated  The PCP has successfully been removed with a patient attribution note  This message will now be completed      Thank you  Darya Jewell

## 2021-07-06 VITALS
WEIGHT: 230 LBS | RESPIRATION RATE: 18 BRPM | OXYGEN SATURATION: 100 % | HEART RATE: 89 BPM | TEMPERATURE: 99.5 F | BODY MASS INDEX: 38.32 KG/M2 | DIASTOLIC BLOOD PRESSURE: 88 MMHG | HEIGHT: 65 IN | SYSTOLIC BLOOD PRESSURE: 120 MMHG

## 2021-07-06 PROCEDURE — 99283 EMERGENCY DEPT VISIT LOW MDM: CPT

## 2021-07-07 ENCOUNTER — HOSPITAL ENCOUNTER (EMERGENCY)
Facility: HOSPITAL | Age: 21
Discharge: LEFT BEFORE DISCHARGE | End: 2021-07-07
Payer: COMMERCIAL

## 2021-07-21 ENCOUNTER — HOSPITAL ENCOUNTER (EMERGENCY)
Facility: HOSPITAL | Age: 21
Discharge: HOME | End: 2021-07-21
Attending: EMERGENCY MEDICINE | Admitting: EMERGENCY MEDICINE
Payer: COMMERCIAL

## 2021-07-21 VITALS
TEMPERATURE: 98.2 F | RESPIRATION RATE: 16 BRPM | OXYGEN SATURATION: 100 % | HEIGHT: 65 IN | BODY MASS INDEX: 38.2 KG/M2 | SYSTOLIC BLOOD PRESSURE: 119 MMHG | WEIGHT: 229.28 LBS | DIASTOLIC BLOOD PRESSURE: 87 MMHG | HEART RATE: 65 BPM

## 2021-07-21 DIAGNOSIS — N39.0 UTI (URINARY TRACT INFECTION): ICD-10-CM

## 2021-07-21 DIAGNOSIS — R11.2 NAUSEA AND VOMITING: Primary | ICD-10-CM

## 2021-07-21 LAB
ALBUMIN SERPL BCP-MCNC: 4.4 G/DL (ref 3.5–5)
ALP SERPL-CCNC: 89 U/L (ref 46–116)
ALT SERPL W P-5'-P-CCNC: 20 U/L (ref 12–78)
ANION GAP SERPL CALCULATED.3IONS-SCNC: 8 MMOL/L (ref 4–13)
AST SERPL W P-5'-P-CCNC: 18 U/L (ref 5–45)
BACTERIA UR QL AUTO: ABNORMAL /HPF
BASOPHILS # BLD AUTO: 0.06 THOUSANDS/ΜL (ref 0–0.1)
BASOPHILS NFR BLD AUTO: 1 % (ref 0–1)
BILIRUB SERPL-MCNC: 0.36 MG/DL (ref 0.2–1)
BILIRUB UR QL STRIP: NEGATIVE
BUN SERPL-MCNC: 10 MG/DL (ref 5–25)
CALCIUM SERPL-MCNC: 9.3 MG/DL (ref 8.3–10.1)
CHLORIDE SERPL-SCNC: 104 MMOL/L (ref 100–108)
CLARITY UR: CLEAR
CO2 SERPL-SCNC: 29 MMOL/L (ref 21–32)
COLOR UR: YELLOW
CREAT SERPL-MCNC: 0.73 MG/DL (ref 0.6–1.3)
EOSINOPHIL # BLD AUTO: 0.21 THOUSAND/ΜL (ref 0–0.61)
EOSINOPHIL NFR BLD AUTO: 3 % (ref 0–6)
ERYTHROCYTE [DISTWIDTH] IN BLOOD BY AUTOMATED COUNT: 12.2 % (ref 11.6–15.1)
EXT PREG TEST URINE: NEGATIVE
EXT. CONTROL ED NAV: NORMAL
GFR SERPL CREATININE-BSD FRML MDRD: 117 ML/MIN/1.73SQ M
GLUCOSE SERPL-MCNC: 102 MG/DL (ref 65–140)
GLUCOSE UR STRIP-MCNC: NEGATIVE MG/DL
HCT VFR BLD AUTO: 44.1 % (ref 34.8–46.1)
HGB BLD-MCNC: 14.9 G/DL (ref 11.5–15.4)
HGB UR QL STRIP.AUTO: NEGATIVE
IMM GRANULOCYTES # BLD AUTO: 0.02 THOUSAND/UL (ref 0–0.2)
IMM GRANULOCYTES NFR BLD AUTO: 0 % (ref 0–2)
KETONES UR STRIP-MCNC: NEGATIVE MG/DL
LEUKOCYTE ESTERASE UR QL STRIP: ABNORMAL
LIPASE SERPL-CCNC: 101 U/L (ref 73–393)
LYMPHOCYTES # BLD AUTO: 2.89 THOUSANDS/ΜL (ref 0.6–4.47)
LYMPHOCYTES NFR BLD AUTO: 36 % (ref 14–44)
MAGNESIUM SERPL-MCNC: 1.7 MG/DL (ref 1.6–2.6)
MCH RBC QN AUTO: 30.8 PG (ref 26.8–34.3)
MCHC RBC AUTO-ENTMCNC: 33.8 G/DL (ref 31.4–37.4)
MCV RBC AUTO: 91 FL (ref 82–98)
MONOCYTES # BLD AUTO: 0.74 THOUSAND/ΜL (ref 0.17–1.22)
MONOCYTES NFR BLD AUTO: 9 % (ref 4–12)
MUCOUS THREADS UR QL AUTO: ABNORMAL
NEUTROPHILS # BLD AUTO: 4.04 THOUSANDS/ΜL (ref 1.85–7.62)
NEUTS SEG NFR BLD AUTO: 51 % (ref 43–75)
NITRITE UR QL STRIP: NEGATIVE
NON-SQ EPI CELLS URNS QL MICRO: ABNORMAL /HPF
NRBC BLD AUTO-RTO: 0 /100 WBCS
PH UR STRIP.AUTO: 5.5 [PH] (ref 4.5–8)
PLATELET # BLD AUTO: 384 THOUSANDS/UL (ref 149–390)
PMV BLD AUTO: 9 FL (ref 8.9–12.7)
POTASSIUM SERPL-SCNC: 4.1 MMOL/L (ref 3.5–5.3)
PROT SERPL-MCNC: 8.1 G/DL (ref 6.4–8.2)
PROT UR STRIP-MCNC: NEGATIVE MG/DL
RBC # BLD AUTO: 4.83 MILLION/UL (ref 3.81–5.12)
RBC #/AREA URNS AUTO: ABNORMAL /HPF
SODIUM SERPL-SCNC: 141 MMOL/L (ref 136–145)
SP GR UR STRIP.AUTO: >=1.03 (ref 1–1.03)
UROBILINOGEN UR QL STRIP.AUTO: 0.2 E.U./DL
WBC # BLD AUTO: 7.96 THOUSAND/UL (ref 4.31–10.16)
WBC #/AREA URNS AUTO: ABNORMAL /HPF

## 2021-07-21 PROCEDURE — 96374 THER/PROPH/DIAG INJ IV PUSH: CPT

## 2021-07-21 PROCEDURE — 36415 COLL VENOUS BLD VENIPUNCTURE: CPT | Performed by: EMERGENCY MEDICINE

## 2021-07-21 PROCEDURE — 83690 ASSAY OF LIPASE: CPT | Performed by: EMERGENCY MEDICINE

## 2021-07-21 PROCEDURE — 81001 URINALYSIS AUTO W/SCOPE: CPT

## 2021-07-21 PROCEDURE — 81025 URINE PREGNANCY TEST: CPT | Performed by: EMERGENCY MEDICINE

## 2021-07-21 PROCEDURE — 96361 HYDRATE IV INFUSION ADD-ON: CPT

## 2021-07-21 PROCEDURE — 80053 COMPREHEN METABOLIC PANEL: CPT | Performed by: EMERGENCY MEDICINE

## 2021-07-21 PROCEDURE — 85025 COMPLETE CBC W/AUTO DIFF WBC: CPT | Performed by: EMERGENCY MEDICINE

## 2021-07-21 PROCEDURE — 83735 ASSAY OF MAGNESIUM: CPT | Performed by: EMERGENCY MEDICINE

## 2021-07-21 PROCEDURE — 87086 URINE CULTURE/COLONY COUNT: CPT

## 2021-07-21 PROCEDURE — 99284 EMERGENCY DEPT VISIT MOD MDM: CPT | Performed by: EMERGENCY MEDICINE

## 2021-07-21 PROCEDURE — 99284 EMERGENCY DEPT VISIT MOD MDM: CPT

## 2021-07-21 RX ORDER — ONDANSETRON 4 MG/1
4 TABLET, ORALLY DISINTEGRATING ORAL EVERY 8 HOURS PRN
Qty: 15 TABLET | Refills: 0 | Status: SHIPPED | OUTPATIENT
Start: 2021-07-21 | End: 2021-07-26

## 2021-07-21 RX ORDER — NITROFURANTOIN 25; 75 MG/1; MG/1
100 CAPSULE ORAL 2 TIMES DAILY
Qty: 10 CAPSULE | Refills: 0 | Status: SHIPPED | OUTPATIENT
Start: 2021-07-21 | End: 2021-07-26

## 2021-07-21 RX ORDER — ONDANSETRON 2 MG/ML
4 INJECTION INTRAMUSCULAR; INTRAVENOUS ONCE
Status: COMPLETED | OUTPATIENT
Start: 2021-07-21 | End: 2021-07-21

## 2021-07-21 RX ADMIN — ONDANSETRON 4 MG: 2 INJECTION INTRAMUSCULAR; INTRAVENOUS at 08:24

## 2021-07-21 RX ADMIN — SODIUM CHLORIDE 1000 ML: 0.9 INJECTION, SOLUTION INTRAVENOUS at 07:11

## 2021-07-21 NOTE — ED PROVIDER NOTES
History  Chief Complaint   Patient presents with    Vomiting     pt had 6 episodes of vomiting this morning, she is also c/o abdominal pain  Pt states she did see some dark red blood in her vomit  Denies CP, SOB or urinary symptoms   Abdominal Pain     HPI     25-year-old female presents emergency department for evaluation of 6 episodes of vomiting  Started this morning  Denies any diarrhea  Denies any chest pain, shortness of breath  Describes mild upper abdominal pain  No aggravating alleviating symptoms  No known sick contacts  No suspect food intake  Reports red color in her vomit  Denies any nausea medications at home  Denies any lightheadedness  Reports urinary frequency    Prior to Admission Medications   Prescriptions Last Dose Informant Patient Reported? Taking? Cholecalciferol 50 MCG (2000 UT) CAPS   Yes No   Sig: Take by mouth   albuterol (ProAir HFA) 90 mcg/act inhaler   Yes No   Sig: Inhale 2 puffs every 4 (four) hours   amphetamine-dextroamphetamine (ADDERALL XR, 30MG,) 30 MG 24 hr capsule   Yes No   Sig: Take 1 capsule by mouth every 24 hours   famotidine (PEPCID) 20 mg tablet   No No   Sig: Take 1 tablet (20 mg total) by mouth 2 (two) times a day   fluticasone-salmeterol (Advair Diskus) 100-50 mcg/dose inhaler   Yes No   Sig: Inhale   sucralfate (CARAFATE) 1 g tablet   No No   Sig: Take 1 tablet (1 g total) by mouth 2 (two) times a day for 7 days      Facility-Administered Medications: None       Past Medical History:   Diagnosis Date    ADHD     Anxiety     Asthma     Depression     Psychiatric disorder        Past Surgical History:   Procedure Laterality Date    CHOLECYSTECTOMY      TONSILLECTOMY         History reviewed  No pertinent family history  I have reviewed and agree with the history as documented      E-Cigarette/Vaping    E-Cigarette Use Never User      E-Cigarette/Vaping Substances     Social History     Tobacco Use    Smoking status: Never Smoker    Smokeless tobacco: Never Used   Vaping Use    Vaping Use: Never used   Substance Use Topics    Alcohol use: Never    Drug use: Never       Review of Systems   Gastrointestinal: Positive for abdominal pain, nausea and vomiting  Genitourinary: Positive for frequency  All other systems reviewed and are negative  Physical Exam  Physical Exam  Vitals and nursing note reviewed  Constitutional:       General: She is not in acute distress  Appearance: She is well-developed  HENT:      Head: Normocephalic and atraumatic  Eyes:      Pupils: Pupils are equal, round, and reactive to light  Cardiovascular:      Rate and Rhythm: Normal rate and regular rhythm  Heart sounds: Normal heart sounds  No murmur heard  Pulmonary:      Effort: Pulmonary effort is normal  No respiratory distress  Breath sounds: Normal breath sounds  No wheezing or rales  Abdominal:      General: Bowel sounds are normal  There is no distension  Palpations: Abdomen is soft  Tenderness: There is no abdominal tenderness  There is no guarding or rebound  Musculoskeletal:         General: No deformity  Normal range of motion  Cervical back: Normal range of motion and neck supple  Lymphadenopathy:      Cervical: No cervical adenopathy  Skin:     Capillary Refill: Capillary refill takes less than 2 seconds  Findings: No erythema or rash  Neurological:      Mental Status: She is alert and oriented to person, place, and time  Cranial Nerves: No cranial nerve deficit  Motor: No abnormal muscle tone        Coordination: Coordination normal    Psychiatric:         Behavior: Behavior normal          Vital Signs  ED Triage Vitals [07/21/21 0700]   Temperature Pulse Respirations Blood Pressure SpO2   98 2 °F (36 8 °C) 80 18 125/82 98 %      Temp Source Heart Rate Source Patient Position - Orthostatic VS BP Location FiO2 (%)   Oral Monitor Sitting Right arm --      Pain Score       -- Vitals:    07/21/21 0700 07/21/21 0907   BP: 125/82 119/87   Pulse: 80 65   Patient Position - Orthostatic VS: Sitting          Visual Acuity      ED Medications  Medications   sodium chloride 0 9 % bolus 1,000 mL (0 mL Intravenous Stopped 7/21/21 1054)   ondansetron (ZOFRAN) injection 4 mg (4 mg Intravenous Given 7/21/21 0824)       Diagnostic Studies  Results Reviewed     Procedure Component Value Units Date/Time    Urine Microscopic [[id number]]  (Abnormal) Collected: 07/21/21 0710    Lab Status: Final result Specimen: Urine, Clean Catch Updated: 07/21/21 0902     RBC, UA 0-1 /hpf      WBC, UA 20-30 /hpf      Epithelial Cells Moderate /hpf      Bacteria, UA Moderate /hpf      MUCUS THREADS Occasional    Urine culture [[id number]] Collected: 07/21/21 0710    Lab Status:  In process Specimen: Urine, Clean Catch Updated: 07/21/21 0902    Comprehensive metabolic panel [[id number]] Collected: 07/21/21 0715    Lab Status: Final result Specimen: Blood from Arm, Right Updated: 07/21/21 0758     Sodium 141 mmol/L      Potassium 4 1 mmol/L      Chloride 104 mmol/L      CO2 29 mmol/L      ANION GAP 8 mmol/L      BUN 10 mg/dL      Creatinine 0 73 mg/dL      Glucose 102 mg/dL      Calcium 9 3 mg/dL      AST 18 U/L      ALT 20 U/L      Alkaline Phosphatase 89 U/L      Total Protein 8 1 g/dL      Albumin 4 4 g/dL      Total Bilirubin 0 36 mg/dL      eGFR 117 ml/min/1 73sq m     Narrative:      Merrick guidelines for Chronic Kidney Disease (CKD):     Stage 1 with normal or high GFR (GFR > 90 mL/min/1 73 square meters)    Stage 2 Mild CKD (GFR = 60-89 mL/min/1 73 square meters)    Stage 3A Moderate CKD (GFR = 45-59 mL/min/1 73 square meters)    Stage 3B Moderate CKD (GFR = 30-44 mL/min/1 73 square meters)    Stage 4 Severe CKD (GFR = 15-29 mL/min/1 73 square meters)    Stage 5 End Stage CKD (GFR <15 mL/min/1 73 square meters)  Note: GFR calculation is accurate only with a steady state creatinine Magnesium [[id number]]  (Normal) Collected: 07/21/21 0715    Lab Status: Final result Specimen: Blood from Arm, Right Updated: 07/21/21 0758     Magnesium 1 7 mg/dL     Lipase [[id number]]  (Normal) Collected: 07/21/21 0715    Lab Status: Final result Specimen: Blood from Arm, Right Updated: 07/21/21 0753     Lipase 101 u/L     CBC and differential [[id number]] Collected: 07/21/21 0715    Lab Status: Final result Specimen: Blood from Arm, Right Updated: 07/21/21 0746     WBC 7 96 Thousand/uL      RBC 4 83 Million/uL      Hemoglobin 14 9 g/dL      Hematocrit 44 1 %      MCV 91 fL      MCH 30 8 pg      MCHC 33 8 g/dL      RDW 12 2 %      MPV 9 0 fL      Platelets 138 Thousands/uL      nRBC 0 /100 WBCs      Neutrophils Relative 51 %      Immat GRANS % 0 %      Lymphocytes Relative 36 %      Monocytes Relative 9 %      Eosinophils Relative 3 %      Basophils Relative 1 %      Neutrophils Absolute 4 04 Thousands/µL      Immature Grans Absolute 0 02 Thousand/uL      Lymphocytes Absolute 2 89 Thousands/µL      Monocytes Absolute 0 74 Thousand/µL      Eosinophils Absolute 0 21 Thousand/µL      Basophils Absolute 0 06 Thousands/µL     POCT pregnancy, urine [[id number]]  (Normal) Resulted: 07/21/21 0713    Lab Status: Final result Updated: 07/21/21 0714     EXT PREG TEST UR (Ref: Negative) negative     Control valid    Urine Macroscopic, POC [[id number]]  (Abnormal) Collected: 07/21/21 0710    Lab Status: Final result Specimen: Urine Updated: 07/21/21 0711     Color, UA Yellow     Clarity, UA Clear     pH, UA 5 5     Leukocytes, UA Large     Nitrite, UA Negative     Protein, UA Negative mg/dl      Glucose, UA Negative mg/dl      Ketones, UA Negative mg/dl      Urobilinogen, UA 0 2 E U /dl      Bilirubin, UA Negative     Blood, UA Negative     Specific Gravity, UA >=1 030    Narrative:      CLINITEK RESULT                 No orders to display              Procedures  Procedures         ED Course                             SBIRT 22yo+      Most Recent Value   SBIRT (24 yo +)   In order to provide better care to our patients, we are screening all of our patients for alcohol and drug use  Would it be okay to ask you these screening questions? Yes Filed at: 07/21/2021 5102   Initial Alcohol Screen: US AUDIT-C    1  How often do you have a drink containing alcohol?  0 Filed at: 07/21/2021 0703   2  How many drinks containing alcohol do you have on a typical day you are drinking? 0 Filed at: 07/21/2021 0703   3a  Male UNDER 65: How often do you have five or more drinks on one occasion? 0 Filed at: 07/21/2021 0703   3b  FEMALE Any Age, or MALE 65+: How often do you have 4 or more drinks on one occassion? 0 Filed at: 07/21/2021 0703   Audit-C Score  0 Filed at: 07/21/2021 8347   JOSE JUAN: How many times in the past year have you    Used an illegal drug or used a prescription medication for non-medical reasons? Never Filed at: 07/21/2021 0703                    MDM  Number of Diagnoses or Management Options  Nausea and vomiting: new and requires workup  UTI (urinary tract infection): new and requires workup  Diagnosis management comments: Abdomen soft, nontender, nondistended  Labs unremarkable except for UTI  Patient with urinary frequency  Patient's nausea improved with Zofran  IV fluids given  Patient reassessed  Abdomen soft, nontender, nondistended  Possibly early viral illness versus food-borne illness  No significant laboratory abnormalities, soft, nontender abdomen, previous history of cholecystectomy, no advanced imaging indicated  Will treat at home with Zofran  No significant pain, no ongoing vomiting  Doubt boorhaaves  PCP follow-up recommended           Amount and/or Complexity of Data Reviewed  Clinical lab tests: ordered and reviewed  Tests in the medicine section of CPT®: ordered and reviewed    Risk of Complications, Morbidity, and/or Mortality  Presenting problems: moderate  Diagnostic procedures: moderate  Management options: moderate    Patient Progress  Patient progress: improved      Disposition  Final diagnoses:   Nausea and vomiting   UTI (urinary tract infection)     Time reflects when diagnosis was documented in both MDM as applicable and the Disposition within this note     Time User Action Codes Description Comment    7/21/2021 10:23 AM Angela Enriques Add [R11 2] Nausea and vomiting     7/21/2021 10:23 AM Angela Cools Add [N39 0] UTI (urinary tract infection)       ED Disposition     ED Disposition Condition Date/Time Comment    Discharge Stable Wed Jul 21, 2021 10:23 AM Vivian Lai discharge to home/self care              Follow-up Information     Follow up With Specialties Details Why Contact Info Additional Information    [facility] Family Medicine Schedule an appointment as soon as possible for a visit in 2 days To establish primary care doctor [address] [id number]  [id number]  , [place], [place], [address] Emergency Department Emergency Medicine Go to  If symptoms worsen [address] [facility] [id number] [place] [address] Emergency Department, [address], [place], [place], [address]          Discharge Medication List as of 7/21/2021 10:25 AM      START taking these medications    Details   nitrofurantoin (MACROBID) 100 mg capsule Take 1 capsule (100 mg total) by mouth 2 (two) times a day for 5 days, Starting Wed 7/21/2021, Until Mon 7/26/2021, Print      ondansetron (ZOFRAN-ODT) 4 mg disintegrating tablet Take 1 tablet (4 mg total) by mouth every 8 (eight) hours as needed for nausea or vomiting for up to 5 days, Starting Wed 7/21/2021, Until Mon 7/26/2021 at 2359, Print         CONTINUE these medications which have NOT CHANGED    Details   albuterol (ProAir HFA) 90 mcg/act inhaler Inhale 2 puffs every 4 (four) hours, Starting Thu 1/21/2010, Historical Med      amphetamine-dextroamphetamine (ADDERALL XR, 30MG,) 30 MG 24 hr capsule Take 1 capsule by mouth every 24 hours, Starting Mon 3/22/2010, Historical Med      Cholecalciferol 50 MCG (2000 UT) CAPS Take by mouth, Historical Med      famotidine (PEPCID) 20 mg tablet Take 1 tablet (20 mg total) by mouth 2 (two) times a day, Starting Mon 5/24/2021, Normal      fluticasone-salmeterol (Advair Diskus) 100-50 mcg/dose inhaler Inhale, Starting Thu 9/24/2009, Historical Med      sucralfate (CARAFATE) 1 g tablet Take 1 tablet (1 g total) by mouth 2 (two) times a day for 7 days, Starting Mon 5/24/2021, Until Mon 5/31/2021, Normal           No discharge procedures on file      PDMP Review       Value Time User    PDMP Reviewed  Yes 5/21/2021  6:50 AM Alyssa Dowell MD          ED Provider  Electronically Signed by           Queenie Harris MD  07/21/21 1904

## 2021-07-21 NOTE — Clinical Note
Ladonna Matias was seen and treated in our emergency department on 7/21/2021  Diagnosis:     Madonna Pacheco  may return to work on return date  She may return on this date: 07/23/2021         If you have any questions or concerns, please don't hesitate to call        Thomas Sotelo MD    ______________________________           _______________          _______________  Hospital Representative                              Date                                Time

## 2021-07-23 LAB — BACTERIA UR CULT: NORMAL

## 2022-09-09 ENCOUNTER — TELEPHONE (OUTPATIENT)
Dept: FAMILY MEDICINE CLINIC | Facility: CLINIC | Age: 22
End: 2022-09-09

## 2025-05-29 ENCOUNTER — TELEPHONE (OUTPATIENT)
Dept: BARIATRICS | Facility: CLINIC | Age: 25
End: 2025-05-29

## 2025-06-10 ENCOUNTER — TELEPHONE (OUTPATIENT)
Age: 25
End: 2025-06-10

## 2025-06-10 NOTE — TELEPHONE ENCOUNTER
Due to pts PA MA she cannot be seen in the [place] practices. I did call the Patient and left a detailed message stating the appointment will be cancelled and she will have to call and schedule with a PA neuro.  I also, spoke to pts mother and informed her as well.

## 2025-06-10 NOTE — TELEPHONE ENCOUNTER
Rescheduled patient's appt with Dr Zavala in [place] office 8/5 @ 8 am added to wait list.    Patient will bring imaging day of appt. Patient will also have PCP put in a referral tomorrow 6/11/25.    Patient needs neuro appt to obtain clearance to return back to work.

## 2025-06-11 ENCOUNTER — OFFICE VISIT (OUTPATIENT)
Dept: FAMILY MEDICINE CLINIC | Facility: CLINIC | Age: 25
End: 2025-06-11
Payer: COMMERCIAL

## 2025-06-11 VITALS
TEMPERATURE: 97.4 F | HEIGHT: 65 IN | HEART RATE: 98 BPM | SYSTOLIC BLOOD PRESSURE: 122 MMHG | WEIGHT: 291.2 LBS | DIASTOLIC BLOOD PRESSURE: 92 MMHG | BODY MASS INDEX: 48.52 KG/M2 | OXYGEN SATURATION: 98 %

## 2025-06-11 DIAGNOSIS — E66.01 MORBID OBESITY WITH BMI OF 45.0-49.9, ADULT (HCC): ICD-10-CM

## 2025-06-11 DIAGNOSIS — F07.81 POSTCONCUSSIVE SYNDROME: Primary | ICD-10-CM

## 2025-06-11 DIAGNOSIS — J45.909 UNCOMPLICATED ASTHMA, UNSPECIFIED ASTHMA SEVERITY, UNSPECIFIED WHETHER PERSISTENT: ICD-10-CM

## 2025-06-11 DIAGNOSIS — F90.9 ATTENTION DEFICIT HYPERACTIVITY DISORDER (ADHD), UNSPECIFIED ADHD TYPE: ICD-10-CM

## 2025-06-11 PROCEDURE — 99204 OFFICE O/P NEW MOD 45 MIN: CPT | Performed by: FAMILY MEDICINE

## 2025-06-11 RX ORDER — DEXTROAMPHETAMINE SACCHARATE, AMPHETAMINE ASPARTATE MONOHYDRATE, DEXTROAMPHETAMINE SULFATE AND AMPHETAMINE SULFATE 7.5; 7.5; 7.5; 7.5 MG/1; MG/1; MG/1; MG/1
30 CAPSULE, EXTENDED RELEASE ORAL EVERY 24 HOURS
Qty: 30 CAPSULE | Refills: 0 | Status: SHIPPED | OUTPATIENT
Start: 2025-06-11

## 2025-06-11 RX ORDER — FLUTICASONE PROPIONATE AND SALMETEROL 100; 50 UG/1; UG/1
1 POWDER RESPIRATORY (INHALATION) 2 TIMES DAILY
Qty: 60 BLISTER | Refills: 3 | Status: SHIPPED | OUTPATIENT
Start: 2025-06-11

## 2025-06-11 NOTE — LETTER
6/11/25  To Whom it May Concern:    Anjel Ibarra [birth year] is a patient under my care and   requires dental work due to multiple complex medical   Problems.    Sincerely yours,  Ne Gunn D.O.

## 2025-06-12 ENCOUNTER — ANNUAL EXAM (OUTPATIENT)
Dept: OBGYN CLINIC | Facility: CLINIC | Age: 25
End: 2025-06-12
Payer: COMMERCIAL

## 2025-06-12 VITALS
DIASTOLIC BLOOD PRESSURE: 92 MMHG | WEIGHT: 291 LBS | HEIGHT: 65 IN | SYSTOLIC BLOOD PRESSURE: 136 MMHG | BODY MASS INDEX: 48.48 KG/M2

## 2025-06-12 DIAGNOSIS — Z31.41 FERTILITY TESTING: ICD-10-CM

## 2025-06-12 DIAGNOSIS — Z01.419 ENCOUNTER FOR GYNECOLOGICAL EXAMINATION WITHOUT ABNORMAL FINDING: Primary | ICD-10-CM

## 2025-06-12 DIAGNOSIS — Z11.3 SCREEN FOR STD (SEXUALLY TRANSMITTED DISEASE): ICD-10-CM

## 2025-06-12 PROCEDURE — 99385 PREV VISIT NEW AGE 18-39: CPT | Performed by: PHYSICIAN ASSISTANT

## 2025-06-12 PROCEDURE — 87591 N.GONORRHOEAE DNA AMP PROB: CPT | Performed by: PHYSICIAN ASSISTANT

## 2025-06-12 PROCEDURE — 87491 CHLMYD TRACH DNA AMP PROBE: CPT | Performed by: PHYSICIAN ASSISTANT

## 2025-06-12 PROCEDURE — G0145 SCR C/V CYTO,THINLAYER,RESCR: HCPCS | Performed by: PHYSICIAN ASSISTANT

## 2025-06-12 NOTE — PROGRESS NOTES
Name: Anjel Ibarra      : 2000      MRN: [id number]  Encounter Provider: Salome Khan PA-C  Encounter Date: 2025   Encounter department: [facility] [place]  :  Assessment & Plan  Encounter for gynecological examination without abnormal finding    Orders:    Liquid-based pap, screening    Screen for STD (sexually transmitted disease)    Orders:    Chlamydia/GC amplified DNA by PCR    Fertility testing    Orders:    Ambulatory Referral to Infertility; Future    Pap and GC/chlamydia screening done.  We will call with STD testing results.  Referral placed to [facility] for further work-up.  If no problems, patient to return in 1 year for routine gyn care.    History of Present Illness   Patient is here for yearly gyn exam.  She is new to our office today.  Last gyn visit was several years go.  States she is doing well overall.  Periods are regular every 26-28 days, and bleeding lasts for 5-7 days.  Flow is light with moderate cramping.  Patient is sexually active with a monogamous partner.  They have been actively trying to conceive for 2 years without success.  Requests STD screening.  Patient denies bowel/bladder changes, pelvic pain, bloating, abdominal pain, n/v, change in appetite, and thyroid disease.  Does not think she's ever had a Pap.    Patient is performing self-breast exam.  Denies new masses, skin changes, nipple discharge, and pain/tenderness.  Family cancer history negative.      Anjel Ibarra is a 25 y.o. female who presents for yearly gyn exam.  History obtained from: patient    Review of Systems   Constitutional:  Negative for appetite change and unexpected weight change.   Cardiovascular:         No masses, skin changes, nipple discharge, and pain/tenderness.   Gastrointestinal:  Negative for abdominal distention, abdominal pain, constipation, diarrhea, nausea and vomiting.   Genitourinary:  Negative for difficulty urinating, dysuria,  "frequency, genital sores, hematuria, menstrual problem, pelvic pain, urgency, vaginal bleeding, vaginal discharge and vaginal pain.          Objective   /92 (BP Location: Left arm, Patient Position: Sitting, Cuff Size: Adult)   Ht 5' 5\" (1.651 m)   Wt 132 kg (291 lb)   LMP 05/28/2025 (Exact Date)   BMI 48.42 kg/m²      Physical Exam  Vitals reviewed. Exam conducted with a chaperone present.   Constitutional:       Appearance: Normal appearance. She is well-developed. She is obese.   Neck:      Thyroid: No thyromegaly.   Pulmonary:      Effort: Pulmonary effort is normal.   Chest:   Breasts:     Breasts are symmetrical.      Right: Normal. No swelling, bleeding, inverted nipple, mass, nipple discharge, skin change or tenderness.      Left: Normal. No swelling, bleeding, inverted nipple, mass, nipple discharge, skin change or tenderness.   Abdominal:      General: There is no distension.      Palpations: Abdomen is soft.      Tenderness: There is no abdominal tenderness.   Genitourinary:     General: Normal vulva.      Pubic Area: No rash.       Labia:         Right: No rash, tenderness, lesion or injury.         Left: No rash, tenderness, lesion or injury.       Vagina: Normal. No vaginal discharge, erythema, tenderness or bleeding.      Cervix: Normal.      Uterus: Normal.       Adnexa: Right adnexa normal and left adnexa normal.        Right: No mass, tenderness or fullness.          Left: No mass, tenderness or fullness.       Musculoskeletal:      Cervical back: Neck supple.   Lymphadenopathy:      Cervical: No cervical adenopathy.      Upper Body:      Right upper body: No supraclavicular or axillary adenopathy.      Left upper body: No supraclavicular or axillary adenopathy.      Lower Body: No right inguinal adenopathy. No left inguinal adenopathy.     Skin:     General: Skin is warm and dry.     Neurological:      Mental Status: She is alert and oriented to person, place, and time.     Psychiatric:  "        Behavior: Behavior normal. Behavior is cooperative.         Thought Content: Thought content normal.         Judgment: Judgment normal.

## 2025-06-12 NOTE — PROGRESS NOTES
Name: Anjel Ibarra      : 2000      MRN: [id number]  Encounter Provider: Ne Gunn DO  Encounter Date: 2025   Encounter department: [facility]    Assessment & Plan  Postconcussive syndrome    Orders:    Ambulatory Referral to Neurology; Future    Attention deficit hyperactivity disorder (ADHD), unspecified ADHD type    Orders:    amphetamine-dextroamphetamine (ADDERALL XR, 30MG,) 30 MG 24 hr capsule; Take 1 capsule (30 mg total) by mouth every 24 hours Max Daily Amount: 30 mg    Uncomplicated asthma, unspecified asthma severity, unspecified whether persistent  Continue current inhaler therapy.  Orders:    Fluticasone-Salmeterol (Advair Diskus) 100-50 mcg/dose inhaler; Inhale 1 puff 2 (two) times a day    Morbid obesity with BMI of 45.0-49.9, adult (HCC)  The patient has an appointment with the bariatric center for evaluation.              History of Present Illness     The patient presents to get established into the practice.  She has not seen a doctor for the past 4 years.  She has a history of ADD and would like to go back on her Adderall.  In addition, she suffered a concussion approximately 4 years ago and has had postconcussive syndrome and symptoms since then.  She is asking for neurology evaluation to follow-up in the concussion clinic.  She has an appointment to see the bariatric center to help with weight loss.      Review of Systems   Constitutional:  Positive for unexpected weight change. Negative for appetite change, chills and fever.        Positive weight gain with difficulty losing weight   HENT:  Negative for ear pain, facial swelling, rhinorrhea, sinus pain, sore throat and trouble swallowing.    Eyes:  Negative for discharge and redness.   Respiratory:  Negative for chest tightness, shortness of breath and wheezing.    Cardiovascular:  Negative for chest pain and palpitations.   Gastrointestinal:  Negative for abdominal pain, diarrhea,  "nausea and vomiting.   Endocrine: Negative for polyuria.   Genitourinary:  Negative for dysuria and urgency.   Musculoskeletal:  Negative for arthralgias and back pain.   Skin:  Negative for rash.   Neurological:  Positive for light-headedness and headaches. Negative for dizziness and weakness.   Hematological:  Negative for adenopathy.   Psychiatric/Behavioral:  Positive for decreased concentration. Negative for behavioral problems, confusion and sleep disturbance.         Positive difficulty focusing and completing tasks   All other systems reviewed and are negative.    Past Medical History[1]  Past Surgical History[2]  Family History[3]  Social History[4]  Medications[5]  Allergies   Allergen Reactions    Penicillins Hives     Category: Allergy;        Immunization History   Administered Date(s) Administered    DTaP 5 1999, 1999, 1999, 03/20/2001, 06/23/2003    HPV Quadrivalent 08/20/2010, 10/20/2010, 03/15/2011    Hep A, adult 10/10/2007, 06/19/2008    Hep B, adult 1999, 1999, 1999    Hib (PRP-OMP) 1999, 1999, 1999, 03/20/2001    Influenza, seasonal, injectable 12/03/2005, 11/22/2006, 10/10/2007, 10/09/2012, 10/31/2014, 11/30/2015    MMR 03/20/2001, 06/23/2003    Meningococcal, Unknown Serogroups 08/20/2010, 03/26/2015    Tdap 08/20/2010    Varicella 04/01/2002, 06/19/2008     Objective   /92   Pulse 98   Temp (!) 97.4 °F (36.3 °C)   Ht 5' 5\" (1.651 m)   Wt 132 kg (291 lb 3.2 oz)   LMP 05/28/2025 (Exact Date)   SpO2 98%   BMI 48.46 kg/m²     Physical Exam  Vitals and nursing note reviewed.   Constitutional:       General: She is not in acute distress.     Appearance: Normal appearance. She is well-developed. She is obese. She is not ill-appearing or diaphoretic.   HENT:      Head: Normocephalic and atraumatic.      Right Ear: Tympanic membrane, ear canal and external ear normal.      Left Ear: Tympanic membrane, ear canal and external ear normal. "      Nose: Nose normal. No congestion or rhinorrhea.      Mouth/Throat:      Mouth: Mucous membranes are moist.      Pharynx: Oropharynx is clear. No oropharyngeal exudate or posterior oropharyngeal erythema.     Eyes:      General: No scleral icterus.        Right eye: No discharge.         Left eye: No discharge.      Extraocular Movements: Extraocular movements intact.      Conjunctiva/sclera: Conjunctivae normal.      Pupils: Pupils are equal, round, and reactive to light.     Neck:      Thyroid: No thyromegaly.      Vascular: No carotid bruit or JVD.      Trachea: No tracheal deviation.     Cardiovascular:      Rate and Rhythm: Normal rate and regular rhythm.      Pulses: Normal pulses.      Heart sounds: Normal heart sounds. No murmur heard.  Pulmonary:      Effort: Pulmonary effort is normal. No respiratory distress.      Breath sounds: Normal breath sounds. No stridor. No wheezing, rhonchi or rales.   Abdominal:      General: Abdomen is flat. Bowel sounds are normal. There is no distension.      Palpations: Abdomen is soft. There is no mass.      Tenderness: There is no abdominal tenderness. There is no guarding or rebound.     Musculoskeletal:         General: No swelling, tenderness or deformity. Normal range of motion.      Cervical back: Normal range of motion and neck supple. No rigidity.      Right lower leg: No edema.      Left lower leg: No edema.   Lymphadenopathy:      Cervical: No cervical adenopathy.     Skin:     General: Skin is warm and dry.      Capillary Refill: Capillary refill takes less than 2 seconds.      Coloration: Skin is not jaundiced.      Findings: No bruising, erythema or rash.     Neurological:      General: No focal deficit present.      Mental Status: She is alert and oriented to person, place, and time.      Cranial Nerves: No cranial nerve deficit.      Sensory: No sensory deficit.      Motor: No abnormal muscle tone.      Coordination: Coordination normal.      Gait: Gait  normal.      Deep Tendon Reflexes: Reflexes are normal and symmetric. Reflexes normal.     Psychiatric:         Mood and Affect: Mood normal.         Behavior: Behavior normal.         Thought Content: Thought content normal.         Judgment: Judgment normal.              [1]   Past Medical History:  Diagnosis Date    ADHD     Anxiety     Asthma     Depression     GERD (gastroesophageal reflux disease)     Psychiatric disorder    [2]   Past Surgical History:  Procedure Laterality Date    CHOLECYSTECTOMY      TONSILLECTOMY     [3]   Family History  Problem Relation Name Age of Onset    Thyroid disease Mother Evonne     ADD / ADHD Father N/a    [4]   Social History  Tobacco Use    Smoking status: Some Days     Types: Cigarettes     Passive exposure: Past    Smokeless tobacco: Never   Vaping Use    Vaping status: Never Used   Substance and Sexual Activity    Alcohol use: Never    Drug use: Never    Sexual activity: Yes     Partners: Male     Birth control/protection: None   [5]   Current Outpatient Medications on File Prior to Visit   Medication Sig    albuterol (ProAir HFA) 90 mcg/act inhaler Inhale 2 puffs every 4 (four) hours    Cholecalciferol 50 MCG (2000 UT) CAPS Take by mouth    famotidine (PEPCID) 20 mg tablet Take 1 tablet (20 mg total) by mouth 2 (two) times a day (Patient not taking: Reported on 6/11/2025)    ondansetron (ZOFRAN-ODT) 4 mg disintegrating tablet Take 1 tablet (4 mg total) by mouth every 8 (eight) hours as needed for nausea or vomiting for up to 5 days (Patient not taking: Reported on 6/11/2025)    sucralfate (CARAFATE) 1 g tablet Take 1 tablet (1 g total) by mouth 2 (two) times a day for 7 days (Patient not taking: Reported on 6/11/2025)

## 2025-06-13 ENCOUNTER — RESULTS FOLLOW-UP (OUTPATIENT)
Dept: OBGYN CLINIC | Facility: CLINIC | Age: 25
End: 2025-06-13

## 2025-06-13 LAB
C TRACH DNA SPEC QL NAA+PROBE: NEGATIVE
N GONORRHOEA DNA SPEC QL NAA+PROBE: NEGATIVE

## 2025-06-18 LAB
LAB AP GYN PRIMARY INTERPRETATION: NORMAL
Lab: NORMAL

## 2025-06-25 ENCOUNTER — TELEPHONE (OUTPATIENT)
Dept: NEUROLOGY | Facility: CLINIC | Age: 25
End: 2025-06-25

## 2025-06-25 NOTE — TELEPHONE ENCOUNTER
Spoke to pt to offer sooner appt on 6/30 or 7/1 with Dr. Zavala but pt wasn't able to accept an appt on those dates/times. They will remain on the waitlist

## 2025-07-11 ENCOUNTER — OFFICE VISIT (OUTPATIENT)
Dept: BARIATRICS | Facility: CLINIC | Age: 25
End: 2025-07-11
Payer: COMMERCIAL

## 2025-07-11 VITALS
OXYGEN SATURATION: 98 % | WEIGHT: 286 LBS | SYSTOLIC BLOOD PRESSURE: 126 MMHG | DIASTOLIC BLOOD PRESSURE: 78 MMHG | HEART RATE: 88 BPM | BODY MASS INDEX: 47.65 KG/M2 | HEIGHT: 65 IN | TEMPERATURE: 97.8 F

## 2025-07-11 DIAGNOSIS — E66.01 MORBID (SEVERE) OBESITY DUE TO EXCESS CALORIES (HCC): Primary | ICD-10-CM

## 2025-07-11 PROCEDURE — 99203 OFFICE O/P NEW LOW 30 MIN: CPT | Performed by: SURGERY

## 2025-07-11 NOTE — PROGRESS NOTES
"- Height/Weight: 65.0\" / 286.0 lb   - BMI:  47.6   - Medical conditions related to obesity: Asthma - 1 Med / NO Other Medical Conditions.   - Does the patient smoke/vape (nicotine, marijuana, hookah, etc): YES Cigarettes ( [employer]) - Pt was aware that she must stop before surgery   - StopBANG: 3/8   - Does the patient currently take a weight loss medication: NO         BARIATRIC CONSULT-INITIAL - BARIATRIC SURGERY  Anjel Ibarra 25 y.o. female MRN: [id number]  Unit/Bed#:  Encounter: [id number]      HPI:  Anjel Ibarra is a 25 y.o. female who presents with morbid obesity to discuss weight loss options.    Review of Systems    Historical Information   Past Medical History[1]  Past Surgical History[2]  Social History   Social History     Substance and Sexual Activity   Alcohol Use Never     Social History     Substance and Sexual Activity   Drug Use Never     Tobacco Use History[3]  Family History: Family history non-contributory    Meds/Allergies   all medications and allergies reviewed  Allergies[4]    Objective       Current Vitals:   Blood Pressure: 126/78 (07/11/25 1359)  Pulse: 88 (07/11/25 1359)  Temperature: 97.8 °F (36.6 °C) (07/11/25 1359)  Height: 5' 5\" (165.1 cm) (07/11/25 1359)  Weight - Scale: 130 kg (286 lb) (07/11/25 1359)  SpO2: 98 % (07/11/25 1359)  Body mass index is 47.59 kg/m².      Invasive Devices       None                   Physical Exam    Lab Results: I have personally reviewed pertinent lab results.    Imaging: Results Review Statement: No pertinent imaging studies reviewed.  EKG, Pathology, and Other Studies: Results Review Statement: No pertinent imaging studies reviewed.    Code Status: [unfilled]  Advance Directive and Living Will:      Power of :    POLST:      Assessment/PLAN:            Patient has a long history of morbid obesity and is presenting to discuss the surgical weight loss options. Patient failed previous attempts to achieve sustainable long term weight " loss and also failed conservative management. We had a long discussion regarding all the surgical weight-loss options at our disposal at this point and reviewed the risks and benefits of each procedure in details as it relates to her age, BMI and medical conditions.    Patient elected to undergo RYGB        We also discussed the importance and need of a preoperative workup to improve postoperative outcomes and reduce the risk of perioperative complications.  I have explained our Enhanced Recovery After Bariatric Surgery (ERABS) protocol and benefits including preoperative, intraoperative and postoperative elements.      As  part of the preoperative process, I will schedule the patient to meet with our dietician, , and . After the evaluation, a referral will also be place to be evaluated by cardiology for perioperative stratification and optimization.     Patient will also be scheduled for an  EGD to evaluate the anatomy of her GI tract prior to the operation.       She was given the opportunity to ask questions and I have answered all of them.  I have discussed and educated the patient with regards to the components of our multidisciplinary program and the importance of compliance and follow up in the post operative period. The patient was also instructed with regards to the importance of behavior modification, nutritional counseling, support meeting attendance and lifestyle changes that are important to ensure long term success.    In terms of comorbidities patient suffers mostly of Past Medical History[5]  Although there is a great statistical chance of improvement or even resolution of most of her associated comorbidities, the results vary from patient to patient and they largely depend on her commitment and compliance.   I informed the patient that the rate of resolution of comorbid conditions following weight loss surgery is between 60 and 90% depending on the severity of the  specific medical condition.I discussed and educated the patient regarding the different components of our multidisciplinary program and the importance of compliance and follow-up in the postoperative period. All questions answered. Patient understands risks and benefits. An image of the procedure was also shown to the patient. After showing the image we discussed all the technical aspects of the procedure and also the potential complications including but not limited to gastrointestinal perforation, leak, obstruction, stricture and hemorrhage. I spent 30 min with the patient more than 50% of the time was spent face to face educating the patient and coordinating care.       [1]   Past Medical History:  Diagnosis Date    ADHD     Anxiety     Asthma     Chiari malformation type I (HCC)     Depression     GERD (gastroesophageal reflux disease)     Psychiatric disorder     Vertebrobasilar artery syndrome    [2]   Past Surgical History:  Procedure Laterality Date    CHOLECYSTECTOMY      TONSILLECTOMY     [3]   Social History  Tobacco Use   Smoking Status Every Day    Types: Cigarettes    Passive exposure: Past   Smokeless Tobacco Never   [4]   Allergies  Allergen Reactions    Penicillins Hives     Category: Allergy;      [5]   Past Medical History:  Diagnosis Date    ADHD     Anxiety     Asthma     Chiari malformation type I (HCC)     Depression     GERD (gastroesophageal reflux disease)     Psychiatric disorder     Vertebrobasilar artery syndrome

## 2025-07-28 ENCOUNTER — CLINICAL SUPPORT (OUTPATIENT)
Dept: BARIATRICS | Facility: CLINIC | Age: 25
End: 2025-07-28

## 2025-07-28 DIAGNOSIS — E66.01 MORBID OBESITY (HCC): Primary | ICD-10-CM

## 2025-07-28 PROCEDURE — RECHECK

## 2025-08-05 ENCOUNTER — OFFICE VISIT (OUTPATIENT)
Dept: NEUROLOGY | Facility: CLINIC | Age: 25
End: 2025-08-05
Payer: COMMERCIAL

## 2025-08-05 VITALS
DIASTOLIC BLOOD PRESSURE: 70 MMHG | BODY MASS INDEX: 47.38 KG/M2 | HEIGHT: 65 IN | OXYGEN SATURATION: 98 % | SYSTOLIC BLOOD PRESSURE: 108 MMHG | TEMPERATURE: 98.1 F | WEIGHT: 284.4 LBS | HEART RATE: 91 BPM

## 2025-08-05 DIAGNOSIS — E66.01 MORBID OBESITY (HCC): ICD-10-CM

## 2025-08-05 DIAGNOSIS — F41.9 ANXIETY AND DEPRESSION: ICD-10-CM

## 2025-08-05 DIAGNOSIS — F32.A ANXIETY AND DEPRESSION: ICD-10-CM

## 2025-08-05 DIAGNOSIS — Z87.820 HISTORY OF CONCUSSION: Primary | ICD-10-CM

## 2025-08-05 DIAGNOSIS — R25.1 EPISODE OF SHAKING: ICD-10-CM

## 2025-08-05 PROCEDURE — 99204 OFFICE O/P NEW MOD 45 MIN: CPT | Performed by: STUDENT IN AN ORGANIZED HEALTH CARE EDUCATION/TRAINING PROGRAM

## 2025-08-07 ENCOUNTER — TELEPHONE (OUTPATIENT)
Age: 25
End: 2025-08-07